# Patient Record
Sex: FEMALE | Race: WHITE | Employment: FULL TIME | URBAN - METROPOLITAN AREA
[De-identification: names, ages, dates, MRNs, and addresses within clinical notes are randomized per-mention and may not be internally consistent; named-entity substitution may affect disease eponyms.]

---

## 2018-10-24 ENCOUNTER — APPOINTMENT (OUTPATIENT)
Dept: GENERAL RADIOLOGY | Age: 58
End: 2018-10-24
Payer: COMMERCIAL

## 2018-10-24 ENCOUNTER — HOSPITAL ENCOUNTER (EMERGENCY)
Age: 58
Discharge: HOME | End: 2018-10-24
Payer: COMMERCIAL

## 2018-10-24 VITALS
RESPIRATION RATE: 16 BRPM | BODY MASS INDEX: 35.88 KG/M2 | HEIGHT: 62 IN | TEMPERATURE: 99.1 F | OXYGEN SATURATION: 96 % | HEART RATE: 90 BPM | SYSTOLIC BLOOD PRESSURE: 152 MMHG | WEIGHT: 195 LBS | DIASTOLIC BLOOD PRESSURE: 71 MMHG

## 2018-10-24 DIAGNOSIS — S93.401A SPRAIN OF RIGHT ANKLE, UNSPECIFIED LIGAMENT, INITIAL ENCOUNTER: Primary | ICD-10-CM

## 2018-10-24 PROCEDURE — 6370000000 HC RX 637 (ALT 250 FOR IP): Performed by: PHYSICIAN ASSISTANT

## 2018-10-24 PROCEDURE — 99283 EMERGENCY DEPT VISIT LOW MDM: CPT

## 2018-10-24 PROCEDURE — 73630 X-RAY EXAM OF FOOT: CPT

## 2018-10-24 PROCEDURE — 73610 X-RAY EXAM OF ANKLE: CPT

## 2018-10-24 RX ORDER — IBUPROFEN 800 MG/1
800 TABLET ORAL ONCE
Status: COMPLETED | OUTPATIENT
Start: 2018-10-24 | End: 2018-10-24

## 2018-10-24 RX ORDER — NAPROXEN 500 MG/1
500 TABLET ORAL 2 TIMES DAILY
Qty: 14 TABLET | Refills: 0 | Status: SHIPPED | OUTPATIENT
Start: 2018-10-24 | End: 2019-04-28

## 2018-10-24 RX ADMIN — IBUPROFEN 800 MG: 800 TABLET ORAL at 20:26

## 2018-10-24 ASSESSMENT — PAIN SCALES - GENERAL
PAINLEVEL_OUTOF10: 3
PAINLEVEL_OUTOF10: 7

## 2018-10-25 NOTE — ED PROVIDER NOTES
[facility]     Department of Emergency Medicine   ED  Provider Note  Admit Date/RoomTime: 10/24/2018  8:13 PM  ED Room: [place] [address]   Chief Complaint   Foot Injury (right foot/ankle injury)    History of Present Illness   Source of history provided by:  patient. History/Exam Limitations: none. Renetta Smiley is a 65 y.o. old female presenting to the emergency department by private vehicle, for Right foot pain which occured just prior to arrival.  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 There has been a history of no prior problems with this area in the past.  Since onset the symptoms have been moderate in degree with ability to bear weight, but with some pain. Her pain is aggraveated by movement and palpation and relieved by nothing. ROS    Pertinent positives and negatives are stated within HPI, all other systems reviewed and are negative. Past Surgical History:   Procedure Laterality Date    BACK SURGERY      ECHO COMPL W DOP COLOR FLOW  7/8/2012          Social History:  reports that she has never smoked. She does not have any smokeless tobacco history on file. She reports that she drinks alcohol. She reports that she does not use drugs. Family History: family history is not on file. Allergies: Dilaudid [hydromorphone hcl]    Physical Exam           ED Triage Vitals [10/24/18 2010]   BP Temp Temp Source Pulse Resp SpO2 Height Weight   (!) 152/71 99.1 °F (37.3 °C) Oral 90 16 96 % 5' 2\" (1.575 m) 195 lb (88.5 kg)      Oxygen Saturation Interpretation: Normal.    Constitutional:  Alert, development consistent with age. Neck:  Normal ROM. Supple. Foot: Right lateral, dorsal and proximal.               Tenderness:  moderate. Swelling: Moderate. Deformity: no.              ROM: full range with pain. Skin:  no erythema, rash or wounds noted.        Neurovascular: Motor deficit: none. Sensory deficit:   none. Pulse deficit: none. Capillary refill: normal.  Ankle:               Tenderness:  none. Swelling: None. Deformity: no.             ROM: full range of motion. Skin:  no erythema, rash or wounds noted. Gait:  limp. Lymphatics: No lymphangitis or adenopathy noted. Neurological:  Oriented. Motor functions intact. Lab / Imaging Results   (All laboratory and radiology results have been personally reviewed by myself)  Labs:  No results found for this visit on 10/24/18. Imaging: All Radiology results interpreted by Radiologist unless otherwise noted. XR ANKLE RIGHT (MIN 3 VIEWS)   Final Result   No fracture, dislocation or soft tissue abnormality. XR FOOT RIGHT (MIN 3 VIEWS)   Final Result   No fracture, dislocation or soft tissue abnormality. ED Course / Medical Decision Making     Medications   ibuprofen (ADVIL;MOTRIN) tablet 800 mg (800 mg Oral Given 10/24/18 2026)      Consult(s):   none. Procedure(s):   none    Medical Decision Making:    Patient presents emergency department for a right foot and ankle injury. Radiographs are obtained and unremarkable. She is encouraged to follow up with her primary care provider she may need repeat x-rays in 5-7 days, if necessary. RICE therapy discussed. .    Counseling: The emergency provider has spoken with the patient and discussed todays results, in addition to providing specific details for the plan of care and counseling regarding the diagnosis and prognosis. Questions are answered at this time and they are agreeable with the plan. Assessment      1.  Sprain of right ankle, unspecified ligament, initial encounter       Plan   Discharge to home  Patient condition is good    New Medications     New Prescriptions    NAPROXEN (NAPROSYN) 500 MG TABLET    Take 1 tablet by mouth 2 times daily for 7 days     Electronically signed by

## 2019-04-28 ENCOUNTER — APPOINTMENT (OUTPATIENT)
Dept: GENERAL RADIOLOGY | Age: 59
DRG: 193 | End: 2019-04-28
Payer: COMMERCIAL

## 2019-04-28 ENCOUNTER — HOSPITAL ENCOUNTER (INPATIENT)
Age: 59
LOS: 4 days | Discharge: HOME | DRG: 193 | End: 2019-05-02
Attending: EMERGENCY MEDICINE | Admitting: INTERNAL MEDICINE
Payer: COMMERCIAL

## 2019-04-28 ENCOUNTER — APPOINTMENT (OUTPATIENT)
Dept: CT IMAGING | Age: 59
DRG: 193 | End: 2019-04-28
Payer: COMMERCIAL

## 2019-04-28 DIAGNOSIS — J18.9 PNEUMONIA DUE TO ORGANISM: ICD-10-CM

## 2019-04-28 DIAGNOSIS — E87.6 HYPOKALEMIA: ICD-10-CM

## 2019-04-28 DIAGNOSIS — E03.9 HYPOTHYROIDISM, UNSPECIFIED TYPE: ICD-10-CM

## 2019-04-28 DIAGNOSIS — J96.01 ACUTE RESPIRATORY FAILURE WITH HYPOXIA (HCC): Primary | ICD-10-CM

## 2019-04-28 DIAGNOSIS — R00.2 PALPITATIONS: ICD-10-CM

## 2019-04-28 LAB
ANION GAP SERPL CALCULATED.3IONS-SCNC: 16 MMOL/L (ref 7–16)
BASOPHILS ABSOLUTE: 0 E9/L (ref 0–0.2)
BASOPHILS RELATIVE PERCENT: 0.1 % (ref 0–2)
BUN BLDV-MCNC: 17 MG/DL (ref 6–20)
CALCIUM SERPL-MCNC: 9.2 MG/DL (ref 8.6–10.2)
CHLORIDE BLD-SCNC: 103 MMOL/L (ref 98–107)
CO2: 21 MMOL/L (ref 22–29)
CREAT SERPL-MCNC: 0.9 MG/DL (ref 0.5–1)
EKG ATRIAL RATE: 69 BPM
EKG P AXIS: 59 DEGREES
EKG P-R INTERVAL: 142 MS
EKG Q-T INTERVAL: 388 MS
EKG QRS DURATION: 82 MS
EKG QTC CALCULATION (BAZETT): 415 MS
EKG R AXIS: 61 DEGREES
EKG T AXIS: 48 DEGREES
EKG VENTRICULAR RATE: 69 BPM
EOSINOPHILS ABSOLUTE: 0 E9/L (ref 0.05–0.5)
EOSINOPHILS RELATIVE PERCENT: 0.2 % (ref 0–6)
FILM ARRAY ADENOVIRUS: ABNORMAL
FILM ARRAY BORDETELLA PERTUSSIS: ABNORMAL
FILM ARRAY CHLAMYDOPHILIA PNEUMONIAE: ABNORMAL
FILM ARRAY CORONAVIRUS 229E: ABNORMAL
FILM ARRAY CORONAVIRUS HKU1: ABNORMAL
FILM ARRAY CORONAVIRUS NL63: ABNORMAL
FILM ARRAY CORONAVIRUS OC43: ABNORMAL
FILM ARRAY INFLUENZA A VIRUS 09H1: ABNORMAL
FILM ARRAY INFLUENZA A VIRUS H1: ABNORMAL
FILM ARRAY INFLUENZA A VIRUS H3: ABNORMAL
FILM ARRAY INFLUENZA A VIRUS: ABNORMAL
FILM ARRAY INFLUENZA B: ABNORMAL
FILM ARRAY MYCOPLASMA PNEUMONIAE: ABNORMAL
FILM ARRAY PARAINFLUENZA VIRUS 1: ABNORMAL
FILM ARRAY PARAINFLUENZA VIRUS 2: ABNORMAL
FILM ARRAY PARAINFLUENZA VIRUS 3: ABNORMAL
FILM ARRAY PARAINFLUENZA VIRUS 4: ABNORMAL
FILM ARRAY RESPIRATORY SYNCITIAL VIRUS: ABNORMAL
FILM ARRAY RHINOVIRUS/ENTEROVIRUS: ABNORMAL
GFR AFRICAN AMERICAN: >60
GFR NON-AFRICAN AMERICAN: >60 ML/MIN/1.73
GLUCOSE BLD-MCNC: 101 MG/DL (ref 74–99)
HCT VFR BLD CALC: 39 % (ref 34–48)
HEMOGLOBIN: 13.3 G/DL (ref 11.5–15.5)
LACTIC ACID: 1 MMOL/L (ref 0.5–2.2)
LYMPHOCYTES ABSOLUTE: 1.72 E9/L (ref 1.5–4)
LYMPHOCYTES RELATIVE PERCENT: 20.9 % (ref 20–42)
MCH RBC QN AUTO: 31.1 PG (ref 26–35)
MCHC RBC AUTO-ENTMCNC: 34.1 % (ref 32–34.5)
MCV RBC AUTO: 91.1 FL (ref 80–99.9)
MONOCYTES ABSOLUTE: 0.98 E9/L (ref 0.1–0.95)
MONOCYTES RELATIVE PERCENT: 12.2 % (ref 2–12)
NEUTROPHILS ABSOLUTE: 5.49 E9/L (ref 1.8–7.3)
NEUTROPHILS RELATIVE PERCENT: 67 % (ref 43–80)
ORGANISM: ABNORMAL
PDW BLD-RTO: 12.9 FL (ref 11.5–15)
PLATELET # BLD: 220 E9/L (ref 130–450)
PMV BLD AUTO: 12 FL (ref 7–12)
POTASSIUM SERPL-SCNC: 3.2 MMOL/L (ref 3.5–5)
RBC # BLD: 4.28 E12/L (ref 3.5–5.5)
RBC # BLD: NORMAL 10*6/UL
REASON FOR REJECTION: NORMAL
REJECTED TEST: NORMAL
SODIUM BLD-SCNC: 140 MMOL/L (ref 132–146)
T4 FREE: 0.99 NG/DL (ref 0.93–1.7)
TROPONIN: <0.01 NG/ML (ref 0–0.03)
TSH SERPL DL<=0.05 MIU/L-ACNC: 10.67 UIU/ML (ref 0.27–4.2)
WBC # BLD: 8.2 E9/L (ref 4.5–11.5)

## 2019-04-28 PROCEDURE — 84443 ASSAY THYROID STIM HORMONE: CPT

## 2019-04-28 PROCEDURE — 6370000000 HC RX 637 (ALT 250 FOR IP): Performed by: INTERNAL MEDICINE

## 2019-04-28 PROCEDURE — 87450 HC DIRECT STREP B ANTIGEN: CPT

## 2019-04-28 PROCEDURE — 83605 ASSAY OF LACTIC ACID: CPT

## 2019-04-28 PROCEDURE — 94640 AIRWAY INHALATION TREATMENT: CPT

## 2019-04-28 PROCEDURE — 6370000000 HC RX 637 (ALT 250 FOR IP): Performed by: STUDENT IN AN ORGANIZED HEALTH CARE EDUCATION/TRAINING PROGRAM

## 2019-04-28 PROCEDURE — 6360000002 HC RX W HCPCS: Performed by: INTERNAL MEDICINE

## 2019-04-28 PROCEDURE — 87040 BLOOD CULTURE FOR BACTERIA: CPT

## 2019-04-28 PROCEDURE — 99285 EMERGENCY DEPT VISIT HI MDM: CPT

## 2019-04-28 PROCEDURE — 36415 COLL VENOUS BLD VENIPUNCTURE: CPT

## 2019-04-28 PROCEDURE — 80048 BASIC METABOLIC PNL TOTAL CA: CPT

## 2019-04-28 PROCEDURE — 87486 CHLMYD PNEUM DNA AMP PROBE: CPT

## 2019-04-28 PROCEDURE — 2700000000 HC OXYGEN THERAPY PER DAY

## 2019-04-28 PROCEDURE — 71045 X-RAY EXAM CHEST 1 VIEW: CPT

## 2019-04-28 PROCEDURE — 84484 ASSAY OF TROPONIN QUANT: CPT

## 2019-04-28 PROCEDURE — 2580000003 HC RX 258: Performed by: INTERNAL MEDICINE

## 2019-04-28 PROCEDURE — 2500000003 HC RX 250 WO HCPCS: Performed by: EMERGENCY MEDICINE

## 2019-04-28 PROCEDURE — 2580000003 HC RX 258: Performed by: RADIOLOGY

## 2019-04-28 PROCEDURE — 87633 RESP VIRUS 12-25 TARGETS: CPT

## 2019-04-28 PROCEDURE — 84439 ASSAY OF FREE THYROXINE: CPT

## 2019-04-28 PROCEDURE — 87798 DETECT AGENT NOS DNA AMP: CPT

## 2019-04-28 PROCEDURE — 71275 CT ANGIOGRAPHY CHEST: CPT

## 2019-04-28 PROCEDURE — 94664 DEMO&/EVAL PT USE INHALER: CPT

## 2019-04-28 PROCEDURE — 2580000003 HC RX 258: Performed by: EMERGENCY MEDICINE

## 2019-04-28 PROCEDURE — 93010 ELECTROCARDIOGRAM REPORT: CPT | Performed by: INTERNAL MEDICINE

## 2019-04-28 PROCEDURE — 2060000000 HC ICU INTERMEDIATE R&B

## 2019-04-28 PROCEDURE — 87581 M.PNEUMON DNA AMP PROBE: CPT

## 2019-04-28 PROCEDURE — 85025 COMPLETE CBC W/AUTO DIFF WBC: CPT

## 2019-04-28 PROCEDURE — 6360000002 HC RX W HCPCS: Performed by: EMERGENCY MEDICINE

## 2019-04-28 PROCEDURE — 6360000004 HC RX CONTRAST MEDICATION: Performed by: RADIOLOGY

## 2019-04-28 PROCEDURE — 93005 ELECTROCARDIOGRAM TRACING: CPT | Performed by: STUDENT IN AN ORGANIZED HEALTH CARE EDUCATION/TRAINING PROGRAM

## 2019-04-28 RX ORDER — SODIUM CHLORIDE 9 MG/ML
INJECTION, SOLUTION INTRAVENOUS CONTINUOUS
Status: DISCONTINUED | OUTPATIENT
Start: 2019-04-28 | End: 2019-05-02

## 2019-04-28 RX ORDER — SODIUM CHLORIDE 0.9 % (FLUSH) 0.9 %
10 SYRINGE (ML) INJECTION EVERY 12 HOURS SCHEDULED
Status: DISCONTINUED | OUTPATIENT
Start: 2019-04-28 | End: 2019-04-28 | Stop reason: SDUPTHER

## 2019-04-28 RX ORDER — GUAIFENESIN/DEXTROMETHORPHAN 100-10MG/5
5 SYRUP ORAL EVERY 4 HOURS PRN
Status: DISCONTINUED | OUTPATIENT
Start: 2019-04-28 | End: 2019-05-02 | Stop reason: HOSPADM

## 2019-04-28 RX ORDER — PREDNISONE 10 MG/1
10 TABLET ORAL DAILY
Status: ON HOLD | COMMUNITY
End: 2019-05-02 | Stop reason: HOSPADM

## 2019-04-28 RX ORDER — LEVOTHYROXINE SODIUM 0.03 MG/1
25 TABLET ORAL DAILY
Status: DISCONTINUED | OUTPATIENT
Start: 2019-04-29 | End: 2019-04-29

## 2019-04-28 RX ORDER — ACETAMINOPHEN 325 MG/1
650 TABLET ORAL EVERY 4 HOURS PRN
Status: DISCONTINUED | OUTPATIENT
Start: 2019-04-28 | End: 2019-05-02 | Stop reason: HOSPADM

## 2019-04-28 RX ORDER — GUAIFENESIN 400 MG/1
400 TABLET ORAL 3 TIMES DAILY
Status: DISCONTINUED | OUTPATIENT
Start: 2019-04-28 | End: 2019-05-02 | Stop reason: HOSPADM

## 2019-04-28 RX ORDER — IPRATROPIUM BROMIDE AND ALBUTEROL SULFATE 2.5; .5 MG/3ML; MG/3ML
1 SOLUTION RESPIRATORY (INHALATION)
Status: COMPLETED | OUTPATIENT
Start: 2019-04-28 | End: 2019-04-28

## 2019-04-28 RX ORDER — BENZONATATE 100 MG/1
100 CAPSULE ORAL 3 TIMES DAILY PRN
Status: DISCONTINUED | OUTPATIENT
Start: 2019-04-28 | End: 2019-05-02 | Stop reason: HOSPADM

## 2019-04-28 RX ORDER — SODIUM CHLORIDE 0.9 % (FLUSH) 0.9 %
10 SYRINGE (ML) INJECTION PRN
Status: DISCONTINUED | OUTPATIENT
Start: 2019-04-28 | End: 2019-05-02 | Stop reason: HOSPADM

## 2019-04-28 RX ORDER — SODIUM CHLORIDE 0.9 % (FLUSH) 0.9 %
10 SYRINGE (ML) INJECTION EVERY 12 HOURS SCHEDULED
Status: DISCONTINUED | OUTPATIENT
Start: 2019-04-28 | End: 2019-05-02 | Stop reason: HOSPADM

## 2019-04-28 RX ORDER — IPRATROPIUM BROMIDE AND ALBUTEROL SULFATE 2.5; .5 MG/3ML; MG/3ML
1 SOLUTION RESPIRATORY (INHALATION)
Status: DISCONTINUED | OUTPATIENT
Start: 2019-04-28 | End: 2019-05-02 | Stop reason: HOSPADM

## 2019-04-28 RX ORDER — LEVOFLOXACIN 750 MG/1
750 TABLET ORAL DAILY
Status: ON HOLD | COMMUNITY
End: 2019-05-02 | Stop reason: HOSPADM

## 2019-04-28 RX ORDER — POTASSIUM CHLORIDE 20 MEQ/1
40 TABLET, EXTENDED RELEASE ORAL ONCE
Status: COMPLETED | OUTPATIENT
Start: 2019-04-28 | End: 2019-04-28

## 2019-04-28 RX ORDER — ACETAMINOPHEN 325 MG/1
650 TABLET ORAL EVERY 4 HOURS PRN
Status: DISCONTINUED | OUTPATIENT
Start: 2019-04-28 | End: 2019-04-28 | Stop reason: SDUPTHER

## 2019-04-28 RX ORDER — ONDANSETRON 2 MG/ML
4 INJECTION INTRAMUSCULAR; INTRAVENOUS EVERY 6 HOURS PRN
Status: DISCONTINUED | OUTPATIENT
Start: 2019-04-28 | End: 2019-05-02 | Stop reason: HOSPADM

## 2019-04-28 RX ORDER — LANOLIN ALCOHOL/MO/W.PET/CERES
3 CREAM (GRAM) TOPICAL NIGHTLY
Status: DISCONTINUED | OUTPATIENT
Start: 2019-04-28 | End: 2019-05-02 | Stop reason: HOSPADM

## 2019-04-28 RX ORDER — SODIUM CHLORIDE 0.9 % (FLUSH) 0.9 %
10 SYRINGE (ML) INJECTION
Status: COMPLETED | OUTPATIENT
Start: 2019-04-28 | End: 2019-04-28

## 2019-04-28 RX ADMIN — IOPAMIDOL 75 ML: 755 INJECTION, SOLUTION INTRAVENOUS at 13:05

## 2019-04-28 RX ADMIN — Medication 3 MG: at 20:36

## 2019-04-28 RX ADMIN — IPRATROPIUM BROMIDE AND ALBUTEROL SULFATE 1 AMPULE: .5; 3 SOLUTION RESPIRATORY (INHALATION) at 18:40

## 2019-04-28 RX ADMIN — GUAIFENESIN AND DEXTROMETHORPHAN 5 ML: 100; 10 SYRUP ORAL at 22:59

## 2019-04-28 RX ADMIN — AZITHROMYCIN DIHYDRATE 500 MG: 500 INJECTION, POWDER, LYOPHILIZED, FOR SOLUTION INTRAVENOUS at 18:10

## 2019-04-28 RX ADMIN — IPRATROPIUM BROMIDE AND ALBUTEROL SULFATE 1 AMPULE: .5; 3 SOLUTION RESPIRATORY (INHALATION) at 11:20

## 2019-04-28 RX ADMIN — CEFTRIAXONE SODIUM 1 G: 1 INJECTION, POWDER, FOR SOLUTION INTRAMUSCULAR; INTRAVENOUS at 17:45

## 2019-04-28 RX ADMIN — SODIUM CHLORIDE: 9 INJECTION, SOLUTION INTRAVENOUS at 17:45

## 2019-04-28 RX ADMIN — POTASSIUM CHLORIDE 40 MEQ: 20 TABLET, EXTENDED RELEASE ORAL at 15:37

## 2019-04-28 RX ADMIN — DOXYCYCLINE 100 MG: 100 INJECTION, POWDER, LYOPHILIZED, FOR SOLUTION INTRAVENOUS at 15:55

## 2019-04-28 RX ADMIN — IPRATROPIUM BROMIDE AND ALBUTEROL SULFATE 1 AMPULE: .5; 3 SOLUTION RESPIRATORY (INHALATION) at 11:05

## 2019-04-28 RX ADMIN — ENOXAPARIN SODIUM 40 MG: 40 INJECTION SUBCUTANEOUS at 17:45

## 2019-04-28 RX ADMIN — CEFTRIAXONE 2 G: 2 INJECTION, POWDER, FOR SOLUTION INTRAMUSCULAR; INTRAVENOUS at 15:35

## 2019-04-28 RX ADMIN — IPRATROPIUM BROMIDE AND ALBUTEROL SULFATE 1 AMPULE: .5; 3 SOLUTION RESPIRATORY (INHALATION) at 10:51

## 2019-04-28 RX ADMIN — Medication 10 ML: at 13:05

## 2019-04-28 RX ADMIN — BENZONATATE 100 MG: 100 CAPSULE ORAL at 22:59

## 2019-04-28 ASSESSMENT — ENCOUNTER SYMPTOMS
NAUSEA: 0
SHORTNESS OF BREATH: 0
COUGH: 0
COLOR CHANGE: 0
CONSTIPATION: 0
ABDOMINAL PAIN: 0
VOMITING: 0
WHEEZING: 0
DIARRHEA: 0

## 2019-04-28 ASSESSMENT — PAIN SCALES - GENERAL: PAINLEVEL_OUTOF10: 0

## 2019-04-28 NOTE — ED PROVIDER NOTES
distress. HENT:   Head: Normocephalic and atraumatic. Right Ear: External ear normal.   Left Ear: External ear normal.   Mouth/Throat: No oropharyngeal exudate. Eyes: Pupils are equal, round, and reactive to light. EOM are normal.   Neck: Normal range of motion. Cardiovascular: Normal rate, regular rhythm, normal heart sounds and intact distal pulses. Exam reveals no gallop and no friction rub. No murmur heard. Pulmonary/Chest: Effort normal. No respiratory distress. She has wheezes (mild expiratory wheezes bilaterally. ). She has rales (Bilateral rales in the lower lung fields. ). She exhibits no tenderness. Abdominal: Soft. Bowel sounds are normal. She exhibits no distension and no mass. There is no tenderness. There is no rebound and no guarding. No hernia. Musculoskeletal: Normal range of motion. She exhibits no edema, tenderness or deformity. Lymphadenopathy:     She has no cervical adenopathy. Neurological: She is alert and oriented to person, place, and time. No cranial nerve deficit. Skin: Skin is warm and dry. Capillary refill takes less than 2 seconds. No rash noted. She is not diaphoretic. No erythema. No pallor. Psychiatric: She has a normal mood and affect. Her behavior is normal. Judgment and thought content normal.   Nursing note and vitals reviewed. Procedures  --------------------------------------------- PAST HISTORY ---------------------------------------------  Past Medical History:  has a past medical history of Thyroid disease. Past Surgical History:  has a past surgical history that includes back surgery and ECHO Compl W Dop Color Flow (7/8/2012). Social History:  reports that she has never smoked. She has never used smokeless tobacco. She reports that she drinks alcohol. She reports that she does not use drugs. Family History: family history is not on file. The patients home medications have been reviewed.     Allergies: Dilaudid [hydromorphone hcl]    -------------------------------------------------- RESULTS -------------------------------------------------    LABS:  Results for orders placed or performed during the hospital encounter of 19   CBC auto differential   Result Value Ref Range    WBC 8.2 4.5 - 11.5 E9/L    RBC 4.28 3.50 - 5.50 E12/L    Hemoglobin 13.3 11.5 - 15.5 g/dL    Hematocrit 39.0 34.0 - 48.0 %    MCV 91.1 80.0 - 99.9 fL    MCH 31.1 26.0 - 35.0 pg    MCHC 34.1 32.0 - 34.5 %    RDW 12.9 11.5 - 15.0 fL    Platelets 004 393 - 381 E9/L    MPV 12.0 7.0 - 12.0 fL    Neutrophils % 67.0 43.0 - 80.0 %    Lymphocytes % 20.9 20.0 - 42.0 %    Monocytes % 12.2 (H) 2.0 - 12.0 %    Eosinophils % 0.2 0.0 - 6.0 %    Basophils % 0.1 0.0 - 2.0 %    Neutrophils # 5.49 1.80 - 7.30 E9/L    Lymphocytes # 1.72 1.50 - 4.00 E9/L    Monocytes # 0.98 (H) 0.10 - 0.95 E9/L    Eosinophils # 0.00 (L) 0.05 - 0.50 E9/L    Basophils # 0.00 0.00 - 0.20 E9/L    RBC Morphology Normal    Basic Metabolic Panel   Result Value Ref Range    Sodium 140 132 - 146 mmol/L    Potassium 3.2 (L) 3.5 - 5.0 mmol/L    Chloride 103 98 - 107 mmol/L    CO2 21 (L) 22 - 29 mmol/L    Anion Gap 16 7 - 16 mmol/L    Glucose 101 (H) 74 - 99 mg/dL    BUN 17 6 - 20 mg/dL    CREATININE 0.9 0.5 - 1.0 mg/dL    GFR Non-African American >60 >=60 mL/min/1.73    GFR African American >60     Calcium 9.2 8.6 - 10.2 mg/dL   Troponin   Result Value Ref Range    Troponin <0.01 0.00 - 0.03 ng/mL   TSH without Reflex   Result Value Ref Range    TSH 10.670 (H) 0.270 - 4.200 uIU/mL   T4, FREE   Result Value Ref Range    T4 Free 0.99 0.93 - 1.70 ng/dL   EKG 12 Lead   Result Value Ref Range    Ventricular Rate 69 BPM    Atrial Rate 69 BPM    P-R Interval 142 ms    QRS Duration 82 ms    Q-T Interval 388 ms    QTc Calculation (Bazett) 415 ms    P Axis 59 degrees    R Axis 61 degrees    T Axis 48 degrees       RADIOLOGY:  Xr Chest Portable    Result Date: 2019  Patient MRN: [id number] : 1960 Age:  63 years Gender: Female Order Date: 2019 10:30 AM Exam: XR CHEST PORTABLE Number of Views: 1 Indication:   Fever Comparison: 10/19/2015 Findings: There is a normal cardiomediastinal silhouette with airspace disease in the right lung base. No pneumothorax. 1. Bibasilar airspace disease either reflective of infiltrate/pneumonia and/or atelectasis. Cta Chest W Contrast    Result Date: 2019  Patient MRN:  [id number] : 1960 Age: 63 years Gender: Female Order Date:  2019 12:30 PM EXAM: CTA CHEST W CONTRAST NUMBER OF IMAGES:  385 INDICATION:  Rule out PE, hypoxia  COMPARISON: 2019 TECHNIQUE: CTA of the chest was performed after the uneventful administration of 75 mL Isovue 370 intravenous contrast. Coronal MIPS reconstructions were obtained. Dose optimization techniques utilized including automatic exposure control and clarity iterative reconstruction. FINDINGS: There is adequate opacification of the pulmonary arteries with minimal motion artifact. No filling defects identified in the pulmonary arteries. There are degenerative changes in the spine. Thoracoabdominal aorta is normal in course and caliber. There are patchy bibasilar airspace opacities suggestive of infection. Central airways are patent. No pleural effusion or pneumothorax seen. Visualized upper abdominal organs are unremarkable. Heart is normal in size. No pericardial effusion. Small hiatal hernia noted. Thyroid gland is atrophic. No mediastinal, hilar, axillary or supraclavicular lymphadenopathy seen. 1. No evidence of pulmonary embolism. 2. Patchy bibasilar airspace opacities suggestive of infection. 3. Small hiatal hernia       EKG: This EKG is signed and interpreted by me.     Rate: 69  Rhythm: Sinus  Interpretation: no acute changes  Comparison: stable as compared to patient's most recent EKG      ------------------------- NURSING NOTES AND VITALS REVIEWED ---------------------------  Date / Time Roomed:  2019 10:11 AM  ED Bed Assignment:  18B/18B-18    The nursing notes within the ED encounter and vital signs as below have been reviewed. Patient Vitals for the past 24 hrs:   BP Temp Temp src Pulse Resp SpO2 Height Weight   04/28/19 1526 (!) 153/78 -- -- 64 16 96 % -- --   04/28/19 1234 128/86 -- -- 76 14 98 % -- --   04/28/19 1225 -- -- -- -- -- (!) 88 % -- --   04/28/19 1111 (!) 150/89 -- -- 87 18 98 % -- --   04/28/19 1058 -- -- -- -- 12 -- -- --   04/28/19 1051 -- -- -- -- 21 -- -- --   04/28/19 1004 138/76 97.7 °F (36.5 °C) Temporal 80 20 91 % 5' 2\" (1.575 m) 209 lb (94.8 kg)       Oxygen Saturation Interpretation: Normal    ------------------------------------------ PROGRESS NOTES ------------------------------------------  ED Course as of Apr 28 1555   Sun Apr 28, 2019   1530 Discussed with Dr. Sade Starr who agreed to admit the patient at this time. [KS]      ED Course User Index  [KS] Colten Sims DO       Counseling:  I have spoken with the patient and discussed todays results, in addition to providing specific details for the plan of care and counseling regarding the diagnosis and prognosis. Their questions are answered at this time and they are agreeable with the plan of admission.    --------------------------------- ADDITIONAL PROVIDER NOTES ---------------------------------  Consultations:  Time: 1530. Spoke with Dr. Sade Starr. Discussed case. They will admit the patient. This patient's ED course included: a personal history and physicial examination, re-evaluation prior to disposition, multiple bedside re-evaluations, IV medications, cardiac monitoring and continuous pulse oximetry    This patient has remained hemodynamically stable during their ED course. Diagnosis:  1. Acute respiratory failure with hypoxia (HCC)    2. Palpitations    3. Hypokalemia    4. Pneumonia due to organism    5.  Hypothyroidism, unspecified type        Disposition:  Patient's disposition: Admit to telemetry  Patient's

## 2019-04-28 NOTE — ED NOTES
Walked in room and patient was 88% on RA.  Patient placed back on 2L O2.      Jacek Starkey RN  04/28/19 [id number]

## 2019-04-29 PROBLEM — J12.3 HUMAN METAPNEUMOVIRUS (HMPV) PNEUMONIA: Status: ACTIVE | Noted: 2019-04-29

## 2019-04-29 PROBLEM — R00.2 PALPITATIONS: Status: ACTIVE | Noted: 2019-04-29

## 2019-04-29 LAB
ANION GAP SERPL CALCULATED.3IONS-SCNC: 14 MMOL/L (ref 7–16)
ATYPICAL LYMPHOCYTE RELATIVE PERCENT: 0.9 % (ref 0–4)
BASOPHILS ABSOLUTE: 0 E9/L (ref 0–0.2)
BASOPHILS RELATIVE PERCENT: 0.5 % (ref 0–2)
BUN BLDV-MCNC: 13 MG/DL (ref 6–20)
CALCIUM SERPL-MCNC: 8.6 MG/DL (ref 8.6–10.2)
CHLORIDE BLD-SCNC: 106 MMOL/L (ref 98–107)
CO2: 22 MMOL/L (ref 22–29)
CREAT SERPL-MCNC: 0.9 MG/DL (ref 0.5–1)
EOSINOPHILS ABSOLUTE: 0.1 E9/L (ref 0.05–0.5)
EOSINOPHILS RELATIVE PERCENT: 1.7 % (ref 0–6)
GFR AFRICAN AMERICAN: >60
GFR NON-AFRICAN AMERICAN: >60 ML/MIN/1.73
GLUCOSE BLD-MCNC: 94 MG/DL (ref 74–99)
HCT VFR BLD CALC: 37.6 % (ref 34–48)
HEMOGLOBIN: 12.7 G/DL (ref 11.5–15.5)
L. PNEUMOPHILA SEROGP 1 UR AG: NORMAL
LYMPHOCYTES ABSOLUTE: 3.36 E9/L (ref 1.5–4)
LYMPHOCYTES RELATIVE PERCENT: 56.5 % (ref 20–42)
MCH RBC QN AUTO: 31 PG (ref 26–35)
MCHC RBC AUTO-ENTMCNC: 33.8 % (ref 32–34.5)
MCV RBC AUTO: 91.7 FL (ref 80–99.9)
MONOCYTES ABSOLUTE: 0.24 E9/L (ref 0.1–0.95)
MONOCYTES RELATIVE PERCENT: 4.3 % (ref 2–12)
NEUTROPHILS ABSOLUTE: 2.18 E9/L (ref 1.8–7.3)
NEUTROPHILS RELATIVE PERCENT: 36.5 % (ref 43–80)
PDW BLD-RTO: 13.1 FL (ref 11.5–15)
PLATELET # BLD: 206 E9/L (ref 130–450)
PMV BLD AUTO: 12.1 FL (ref 7–12)
POTASSIUM REFLEX MAGNESIUM: 3.9 MMOL/L (ref 3.5–5)
PROCALCITONIN: 0.08 NG/ML (ref 0–0.08)
RBC # BLD: 4.1 E12/L (ref 3.5–5.5)
SODIUM BLD-SCNC: 142 MMOL/L (ref 132–146)
STREP PNEUMONIAE ANTIGEN, URINE: NORMAL
T4 FREE: 1.11 NG/DL (ref 0.93–1.7)
WBC # BLD: 5.9 E9/L (ref 4.5–11.5)

## 2019-04-29 PROCEDURE — 84439 ASSAY OF FREE THYROXINE: CPT

## 2019-04-29 PROCEDURE — 97165 OT EVAL LOW COMPLEX 30 MIN: CPT

## 2019-04-29 PROCEDURE — 6370000000 HC RX 637 (ALT 250 FOR IP): Performed by: INTERNAL MEDICINE

## 2019-04-29 PROCEDURE — 2580000003 HC RX 258: Performed by: INTERNAL MEDICINE

## 2019-04-29 PROCEDURE — 97161 PT EVAL LOW COMPLEX 20 MIN: CPT

## 2019-04-29 PROCEDURE — 6360000002 HC RX W HCPCS: Performed by: INTERNAL MEDICINE

## 2019-04-29 PROCEDURE — 84145 PROCALCITONIN (PCT): CPT

## 2019-04-29 PROCEDURE — 2700000000 HC OXYGEN THERAPY PER DAY

## 2019-04-29 PROCEDURE — 2060000000 HC ICU INTERMEDIATE R&B

## 2019-04-29 PROCEDURE — 36415 COLL VENOUS BLD VENIPUNCTURE: CPT

## 2019-04-29 PROCEDURE — 80048 BASIC METABOLIC PNL TOTAL CA: CPT

## 2019-04-29 PROCEDURE — 85025 COMPLETE CBC W/AUTO DIFF WBC: CPT

## 2019-04-29 PROCEDURE — 94640 AIRWAY INHALATION TREATMENT: CPT

## 2019-04-29 RX ORDER — LEVOTHYROXINE SODIUM 0.05 MG/1
50 TABLET ORAL DAILY
Status: DISCONTINUED | OUTPATIENT
Start: 2019-04-29 | End: 2019-05-02 | Stop reason: HOSPADM

## 2019-04-29 RX ORDER — PREDNISONE 20 MG/1
20 TABLET ORAL 2 TIMES DAILY
Status: DISCONTINUED | OUTPATIENT
Start: 2019-04-29 | End: 2019-04-29

## 2019-04-29 RX ORDER — MONTELUKAST SODIUM 10 MG/1
10 TABLET ORAL NIGHTLY
Status: DISCONTINUED | OUTPATIENT
Start: 2019-04-29 | End: 2019-05-02 | Stop reason: HOSPADM

## 2019-04-29 RX ORDER — METHYLPREDNISOLONE SODIUM SUCCINATE 40 MG/ML
40 INJECTION, POWDER, LYOPHILIZED, FOR SOLUTION INTRAMUSCULAR; INTRAVENOUS EVERY 8 HOURS
Status: DISCONTINUED | OUTPATIENT
Start: 2019-04-29 | End: 2019-04-30

## 2019-04-29 RX ADMIN — IPRATROPIUM BROMIDE AND ALBUTEROL SULFATE 1 AMPULE: .5; 3 SOLUTION RESPIRATORY (INHALATION) at 09:01

## 2019-04-29 RX ADMIN — LEVOTHYROXINE SODIUM 25 MCG: 25 TABLET ORAL at 06:30

## 2019-04-29 RX ADMIN — ONDANSETRON 4 MG: 2 INJECTION INTRAMUSCULAR; INTRAVENOUS at 10:27

## 2019-04-29 RX ADMIN — METHYLPREDNISOLONE SODIUM SUCCINATE 40 MG: 40 INJECTION, POWDER, FOR SOLUTION INTRAMUSCULAR; INTRAVENOUS at 18:00

## 2019-04-29 RX ADMIN — Medication 3 MG: at 20:24

## 2019-04-29 RX ADMIN — GUAIFENESIN 400 MG: 400 TABLET ORAL at 20:24

## 2019-04-29 RX ADMIN — METHYLPREDNISOLONE SODIUM SUCCINATE 40 MG: 40 INJECTION, POWDER, FOR SOLUTION INTRAMUSCULAR; INTRAVENOUS at 10:14

## 2019-04-29 RX ADMIN — ENOXAPARIN SODIUM 40 MG: 40 INJECTION SUBCUTANEOUS at 08:51

## 2019-04-29 RX ADMIN — IPRATROPIUM BROMIDE AND ALBUTEROL SULFATE 1 AMPULE: .5; 3 SOLUTION RESPIRATORY (INHALATION) at 16:13

## 2019-04-29 RX ADMIN — IPRATROPIUM BROMIDE AND ALBUTEROL SULFATE 1 AMPULE: .5; 3 SOLUTION RESPIRATORY (INHALATION) at 19:56

## 2019-04-29 RX ADMIN — LEVOTHYROXINE SODIUM 50 MCG: 50 TABLET ORAL at 14:20

## 2019-04-29 RX ADMIN — GUAIFENESIN 400 MG: 400 TABLET ORAL at 08:51

## 2019-04-29 RX ADMIN — Medication 10 ML: at 08:51

## 2019-04-29 RX ADMIN — IPRATROPIUM BROMIDE AND ALBUTEROL SULFATE 1 AMPULE: .5; 3 SOLUTION RESPIRATORY (INHALATION) at 12:54

## 2019-04-29 RX ADMIN — GUAIFENESIN 400 MG: 400 TABLET ORAL at 13:58

## 2019-04-29 RX ADMIN — GUAIFENESIN 400 MG: 400 TABLET ORAL at 02:17

## 2019-04-29 RX ADMIN — SODIUM CHLORIDE: 9 INJECTION, SOLUTION INTRAVENOUS at 13:42

## 2019-04-29 RX ADMIN — MONTELUKAST SODIUM 10 MG: 10 TABLET, FILM COATED ORAL at 20:24

## 2019-04-29 RX ADMIN — GUAIFENESIN AND DEXTROMETHORPHAN 5 ML: 100; 10 SYRUP ORAL at 06:30

## 2019-04-29 ASSESSMENT — PAIN SCALES - GENERAL
PAINLEVEL_OUTOF10: 0

## 2019-04-29 NOTE — PROGRESS NOTES
Dr. Bertha Cherry paged via perfect serve with message left for patient a mucolytic and cough drop order.  Zoraida Savage

## 2019-04-29 NOTE — H&P
[address] [facility]  History and Physical      CHIEF COMPLAINT:  palp      HISTORY OF PRESENT ILLNESS:      The patient is a 63 y.o. female patient of Dr Na Tobin who presents with palp    Past Medical History:    Past Medical History:   Diagnosis Date    Thyroid disease        Past Surgical History:    Past Surgical History:   Procedure Laterality Date    BACK SURGERY      ECHO COMPL W DOP COLOR FLOW  7/8/2012            Medications Prior to Admission:    Medications Prior to Admission: predniSONE (DELTASONE) 10 MG tablet, Take 10 mg by mouth daily Tapering dose  levofloxacin (LEVAQUIN) 750 MG tablet, Take 750 mg by mouth daily  albuterol (PROVENTIL HFA) 108 (90 BASE) MCG/ACT inhaler, Inhale 2 puffs into the lungs every 6 hours as needed for Wheezing  Melatonin 3 MG TABS, Take 3 mg by mouth nightly. Levothyroxine Sodium (SYNTHROID PO), Take 25 mcg by mouth     Allergies:    Dilaudid [hydromorphone hcl]    Social History:    reports that she has never smoked. She has never used smokeless tobacco. She reports that she drinks alcohol. She reports that she does not use drugs. Family History:   family history is not on file. REVIEW OF SYSTEMS:  As above in the HPI, otherwise negative    PHYSICAL EXAM:    Vitals:  /61   Pulse 76   Temp 98.1 °F (36.7 °C) (Temporal)   Resp 16   Ht 5' 2\" (1.575 m)   Wt 207 lb 1.6 oz (93.9 kg)   LMP 06/24/2012   SpO2 97%   BMI 37.88 kg/m²     General:  Awake, alert, oriented X 3. Well developed, well nourished, well groomed. No apparent distress. HEENT:  Normocephalic, atraumatic. Pupils equal, round, reactive to light. No scleral icterus. No conjunctival injection. Normal lips, teeth, and gums. No nasal discharge. Neck:  Supple  Heart:  RRR, no murmurs, gallops, rubs  Lungs:  CTA bilaterally, bilat symmetrical expansion, no wheeze, rales, or rhonchi  Abdomen:   Bowel sounds present, soft, nontender, no masses, no organomegaly, no peritoneal signs  Extremities:  No clubbing, cyanosis, or edema  Skin:  Warm and dry, no open lesions or rash  Neuro:  Cranial nerves 2-12 intact, no focal deficits  Breast: deferred  Rectal: deferred  Genitalia:  deferred    LABS:    CBC with Differential:    Lab Results   Component Value Date    WBC 5.9 04/29/2019    RBC 4.10 04/29/2019    HGB 12.7 04/29/2019    HCT 37.6 04/29/2019     04/29/2019    MCV 91.7 04/29/2019    MCH 31.0 04/29/2019    MCHC 33.8 04/29/2019    RDW 13.1 04/29/2019    LYMPHOPCT 20.9 04/28/2019    MONOPCT 12.2 04/28/2019    BASOPCT 0.1 04/28/2019    MONOSABS 0.98 04/28/2019    LYMPHSABS 1.72 04/28/2019    EOSABS 0.00 04/28/2019    BASOSABS 0.00 04/28/2019     CMP:    Lab Results   Component Value Date     04/29/2019    K 3.9 04/29/2019     04/29/2019    CO2 22 04/29/2019    BUN 13 04/29/2019    CREATININE 0.9 04/29/2019    GFRAA >60 04/29/2019    LABGLOM >60 04/29/2019    GLUCOSE 94 04/29/2019    PROT 6.5 07/08/2012    LABALBU 3.7 07/08/2012    CALCIUM 8.6 04/29/2019    BILITOT 0.4 07/08/2012    ALKPHOS 105 07/08/2012    AST 18 07/08/2012    ALT 18 07/08/2012     Magnesium:  No results found for: MG  Phosphorus:  No results found for: PHOS  HgBA1c:  No results found for: LABA1C  FLP:  No results found for: TRIG, HDL, LDLCALC, LDLDIRECT, LABVLDL  TSH:    Lab Results   Component Value Date    TSH 10.670 04/28/2019       CTA CHEST W CONTRAST   Final Result      1. No evidence of pulmonary embolism. 2. Patchy bibasilar airspace opacities suggestive of infection. 3. Small hiatal hernia                  XR CHEST PORTABLE   Final Result   1. Bibasilar airspace disease either reflective of   infiltrate/pneumonia and/or atelectasis. ASSESSMENT:      Principal Problem:    Acute respiratory failure with hypoxia (HCC)  Active Problems:    Hypothyroid    Human metapneumovirus (hMPV) pneumonia    Palpitations  Resolved Problems:    * No resolved hospital problems. *      PLAN:    Admit  O2  Nebs  Steroids  procalcitonin  No Abx for viral infection   Medications for other co morbidities cont as appropriate w dosage adjustments as necessary   PT/OT  DVT PPx  DC planning        Electronically signed by Ricky Barroso MD on 4/29/2019 at 9:42 AM

## 2019-04-29 NOTE — H&P
Lethia Child  Dr. Victor M Kirk M.D. History and Physical      CHIEF COMPLAINT:  Fevers sob, weak    Reason for Admission:  Cap and resp failure     History Obtained From: patient   HISTORY OF PRESENT ILLNESS:      The patient is a 63 y.o. female of Mohsen Scott MD with significant past medical history of hypothyroidism - not taking meds anymore ,  who presents with complaints of cough , sob, light fevers at home. She went to urgent care and was give po prednisone and levaquin. She continued to worsen. She therefore came to ED . She is noted to have diffuse opacities on CTA chest - no PE ,  and positive for meta pneumovirus. Admitted for further eval/tx. /61   Pulse 76   Temp 98.1 °F (36.7 °C) (Temporal)   Resp 16   Ht 5' 2\" (1.575 m)   Wt 207 lb 1.6 oz (93.9 kg)   LMP 06/24/2012   SpO2 97%   BMI 37.88 kg/m²     Past Medical History:        Diagnosis Date    Thyroid disease      Past Surgical History:        Procedure Laterality Date    BACK SURGERY      ECHO COMPL W DOP COLOR FLOW  7/8/2012              Medications Prior to Admission:    Medications Prior to Admission: predniSONE (DELTASONE) 10 MG tablet, Take 10 mg by mouth daily Tapering dose  levofloxacin (LEVAQUIN) 750 MG tablet, Take 750 mg by mouth daily  albuterol (PROVENTIL HFA) 108 (90 BASE) MCG/ACT inhaler, Inhale 2 puffs into the lungs every 6 hours as needed for Wheezing  Melatonin 3 MG TABS, Take 3 mg by mouth nightly. Levothyroxine Sodium (SYNTHROID PO), Take 25 mcg by mouth     Allergies:  Dilaudid [hydromorphone hcl]    Social History:   TOBACCO:   reports that she has never smoked. She has never used smokeless tobacco.  ETOH:   reports that she drinks alcohol. MARITAL STATUS:    OCCUPATION:      Family History:   History reviewed. No pertinent family history.     REVIEW OF SYSTEMS:    General ROS: positive for  - chills, fatigue and malaise, cough   Hematological and Lymphatic ROS: negative  Endocrine ROS: negative  Respiratory ROS: no cough, shortness of breath, or wheezing  Cardiovascular ROS: no chest pain or dyspnea on exertion  Gastrointestinal ROS: no abdominal pain, change in bowel habits, or black or bloody stools  Genito-Urinary ROS: no dysuria, trouble voiding, or hematuria  Neurological ROS: no TIA or stroke symptoms  negative    Vitals:  /61   Pulse 76   Temp 98.1 °F (36.7 °C) (Temporal)   Resp 16   Ht 5' 2\" (1.575 m)   Wt 207 lb 1.6 oz (93.9 kg)   LMP 06/24/2012   SpO2 97%   BMI 37.88 kg/m²     PHYSICAL EXAM:  General:  Awake, alert, oriented X 3. Well developed, well nourished, well groomed. No apparent distress. HEENT:  Normocephalic, atraumatic. Pupils equal, round, reactive to light. No scleral icterus. No conjunctival injection. Normal lips, teeth, and gums. No nasal discharge. Neck:  Supple  Heart:  RRR, no murmurs, gallops, rubs, carotid upstroke normal, no carotid bruits  Lungs:  Coarse bl rhionchi in lung fields   Abdomen: Bowel sounds present, soft, nontender, no masses, no organomegaly, no peritoneal signs  Extremities:  No clubbing, cyanosis, or edema  Skin:  Warm and dry, no open lesions or rash  Neuro:  Cranial nerves 2-12 intact, no focal deficits  Breast: deferred  Rectal: deferred  Genitalia:  deferred      DATA:     Recent Labs     04/28/19  1050 04/29/19  0702   WBC 8.2 5.9   HGB 13.3 12.7    206     Recent Labs     04/28/19  1050 04/29/19  0702    142   K 3.2* 3.9   BUN 17 13   CREATININE 0.9 0.9     No results for input(s): PROT, INR in the last 72 hours. No results for input(s): AST, ALT, ALKPHOS, BILIDIR, BILITOT in the last 72 hours. No results for input(s): BNP in the last 72 hours.   Recent Labs     04/28/19  1050   TROPONINI <0.01       ASSESSMENT:      Principal Problem:    Acute respiratory failure with hypoxia (HCC)  Active Problems:    Hypothyroid    Human metapneumovirus (hMPV) pneumonia    Palpitations  Resolved Problems:    * No

## 2019-04-29 NOTE — PROGRESS NOTES
OCCUPATIONAL THERAPY INITIAL EVALUATION      Date:2019  Patient Name: Bill Elder  MRN: [id number]  : 1960  Room: [address] [place], [place] [address]    Evaluating OT: JOHN Nino OTR/L [id number]    AM-PAC Daily Activity Raw Score:   Recommended Adaptive Equipment: TBD     Diagnosis: acute respiratory failure   Surgery: n/a   Pertinent Medical History: thyroid disease   Precautions:  Falls, contact/droplet, O2 (with no prior use at home)     Home Living: Pt lives  in a 1 story home with 5 step(s) to enter and 0 rail(s); bed/bath on main floor; laundry in basement with 20 steps down  Bathroom setup: tub/shower unit   Equipment owned: none  Prior Level of Function: IND with ADLs , IND with IADLs; using no AD for functional mobility   Driving: yes  Occupation: guidance secretary    Pain Level: 0/10  Cognition: A&O: 4/4; Follows multi step directions   Memory:  good    Sequencing:  good    Problem solving:  good    Judgement/safety:  good      Functional Assessment:   Initial Eval Status  Date: 19     Feeding IND      Grooming Mod I (standing at sink for hand hygiene)      UB Dressing IND      LB Dressing IND (pt able to doff/edwin B socks EOB using crossing of leg technique)     Bathing Mod I     Toileting Mod I     Bed Mobility  Log roll: IND  Supine to sit: IND   Sit to supine: IND      Functional Transfers Sit to stand:IND   Stand to sit:IND  Stand pivot: IND  Commode: Mod I     Functional Mobility Mod I     Balance Sitting:     Static:  IND    Dynamic:IND  Standing: Mod I     Activity Tolerance good     Visual/  Perceptual Glasses: yes             Hand dominance: L  UE ROM: RUE:  WFL  LUE:  WFL  Strength: RUE: grossly 5/5 LUE: grossly 5/5   Strength: B WFL  Fine Motor Coordination:  WFL    Hearing: WFL  Sensation: c/o numbness or tingling in L hand occassionally  Tone:  WFL  Edema: none noted                            Comments/Treatment: Cleared by RN to see pt.  Upon arrival, patient sitting upright in

## 2019-04-29 NOTE — PROGRESS NOTES
[address] [facility]  Physical Therapy Initial Evaluation  Name: Renato Robles  : 1960  MRN: [id number]    Date of Service: 2019    Evaluating Therapist: Renate Bray, PT, DPT  [id number]    Room #: 6726/7580-A  DIAGNOSIS: Acute respiratory failure  PRECAUTIONS: Low fall risk, Contact and droplet isolation, O2    Social:  Pt lives  in a 1 story home with 5 stairs and 0 rail/s to enter. There are 20 steps and 1 rail/s to basement laundry. Pt was Independent for amb and works full time. Initial Evaluation  Date:    Was pt agreeable to Eval/treatment? Yes   Does pt have pain? no   Bed Mobility  Rolling: Independent   Supine to sit: Independent   Sit to supine: Independent   Scooting: Independent    Transfers Sit to stand: Independent   Stand to sit: Independent   Stand pivot: Independent    Ambulation   100 feet using no AD  with Garza   Stair negotiation:  NT    ROM RLE: WFL  LLE: WFL   Strength RLE: WFL  LLE: WFL   Balance   Sitting: Independent   Standing: Independent    AM-PAC Basic Mobility Inpatient Short Form Raw Score:  24/24     Patient is A&Ox4. Sensation: NT  Coordination: NT  Edema: None noted     ASSESSMENT  Pt displays functional ability as noted in the objective portion of this evaluation. Comments/Treatment:  Pt was cleared by RN prior to PT evaluation. Pt was received supine and was agreeable to PT evaluation. Pt amb with steady gait and good gait speed. Pt denied feeling unsteady or having any functional mobility deficits. Pt educated on PLB and benefits of OOB activity. Pt was left sitting up in bed with call button within reach and all needs met. Patient education  Pt educated on PT role, pursed lip breathing and preventing iatrogenic effects. Patient response to education:   Pt verbalized understanding Pt demonstrated skill Pt requires further education in this area   Yes       Pts/ family goals   1. To return home.      Patient and or family understand(s) diagnosis, prognosis, and plan of care. PLAN  Patient does not demonstrate any skilled PT needs at this time and will be discharged from PT services.       Time in: 1015  Time out: 304 Tani Milligan, JOSE A  License [id number]

## 2019-04-29 NOTE — PLAN OF CARE
Problem: Physical Regulation:  Goal: Prevent transmision of infection  Description  Prevent transmision of infection  Outcome: Ongoing

## 2019-04-29 NOTE — CARE COORDINATION
4/29/2019social work:discharge planning   Initial assessment done with patient. Patient lives with her  and has no hhc and jsut received a nebulizer. Her dr is dr Yocasta Euceda. Discussed discharge plan/transition of care and sw role. Plan is home with family and no needs. She states she drove herself here and will drive herself home.  Will follow and assist.

## 2019-04-30 LAB
ANION GAP SERPL CALCULATED.3IONS-SCNC: 14 MMOL/L (ref 7–16)
BUN BLDV-MCNC: 13 MG/DL (ref 6–20)
CALCIUM SERPL-MCNC: 9.1 MG/DL (ref 8.6–10.2)
CHLORIDE BLD-SCNC: 107 MMOL/L (ref 98–107)
CK MB: 1.8 NG/ML (ref 0–4.3)
CO2: 21 MMOL/L (ref 22–29)
CREAT SERPL-MCNC: 0.7 MG/DL (ref 0.5–1)
GFR AFRICAN AMERICAN: >60
GFR NON-AFRICAN AMERICAN: >60 ML/MIN/1.73
GLUCOSE BLD-MCNC: 162 MG/DL (ref 74–99)
HCT VFR BLD CALC: 36.7 % (ref 34–48)
HEMOGLOBIN: 12.5 G/DL (ref 11.5–15.5)
MCH RBC QN AUTO: 31.3 PG (ref 26–35)
MCHC RBC AUTO-ENTMCNC: 34.1 % (ref 32–34.5)
MCV RBC AUTO: 91.8 FL (ref 80–99.9)
PDW BLD-RTO: 12.9 FL (ref 11.5–15)
PLATELET # BLD: 230 E9/L (ref 130–450)
PMV BLD AUTO: 12.4 FL (ref 7–12)
POTASSIUM SERPL-SCNC: 4 MMOL/L (ref 3.5–5)
RBC # BLD: 4 E12/L (ref 3.5–5.5)
SODIUM BLD-SCNC: 142 MMOL/L (ref 132–146)
TOTAL CK: 167 U/L (ref 20–180)
TROPONIN: <0.01 NG/ML (ref 0–0.03)
WBC # BLD: 7.4 E9/L (ref 4.5–11.5)

## 2019-04-30 PROCEDURE — 94640 AIRWAY INHALATION TREATMENT: CPT

## 2019-04-30 PROCEDURE — 82550 ASSAY OF CK (CPK): CPT

## 2019-04-30 PROCEDURE — 87206 SMEAR FLUORESCENT/ACID STAI: CPT

## 2019-04-30 PROCEDURE — 2060000000 HC ICU INTERMEDIATE R&B

## 2019-04-30 PROCEDURE — 87070 CULTURE OTHR SPECIMN AEROBIC: CPT

## 2019-04-30 PROCEDURE — 36415 COLL VENOUS BLD VENIPUNCTURE: CPT

## 2019-04-30 PROCEDURE — 82553 CREATINE MB FRACTION: CPT

## 2019-04-30 PROCEDURE — 99254 IP/OBS CNSLTJ NEW/EST MOD 60: CPT | Performed by: INTERNAL MEDICINE

## 2019-04-30 PROCEDURE — 80048 BASIC METABOLIC PNL TOTAL CA: CPT

## 2019-04-30 PROCEDURE — 2700000000 HC OXYGEN THERAPY PER DAY

## 2019-04-30 PROCEDURE — 6370000000 HC RX 637 (ALT 250 FOR IP): Performed by: INTERNAL MEDICINE

## 2019-04-30 PROCEDURE — 2580000003 HC RX 258: Performed by: INTERNAL MEDICINE

## 2019-04-30 PROCEDURE — 85027 COMPLETE CBC AUTOMATED: CPT

## 2019-04-30 PROCEDURE — 6360000002 HC RX W HCPCS: Performed by: INTERNAL MEDICINE

## 2019-04-30 PROCEDURE — 93005 ELECTROCARDIOGRAM TRACING: CPT | Performed by: INTERNAL MEDICINE

## 2019-04-30 PROCEDURE — 84484 ASSAY OF TROPONIN QUANT: CPT

## 2019-04-30 RX ORDER — METHYLPREDNISOLONE SODIUM SUCCINATE 40 MG/ML
40 INJECTION, POWDER, LYOPHILIZED, FOR SOLUTION INTRAMUSCULAR; INTRAVENOUS EVERY 12 HOURS
Status: DISCONTINUED | OUTPATIENT
Start: 2019-04-30 | End: 2019-05-02 | Stop reason: HOSPADM

## 2019-04-30 RX ADMIN — IPRATROPIUM BROMIDE AND ALBUTEROL SULFATE 1 AMPULE: .5; 3 SOLUTION RESPIRATORY (INHALATION) at 11:31

## 2019-04-30 RX ADMIN — GUAIFENESIN 400 MG: 400 TABLET ORAL at 13:48

## 2019-04-30 RX ADMIN — ENOXAPARIN SODIUM 40 MG: 40 INJECTION SUBCUTANEOUS at 08:54

## 2019-04-30 RX ADMIN — LEVOTHYROXINE SODIUM 50 MCG: 50 TABLET ORAL at 06:53

## 2019-04-30 RX ADMIN — Medication 10 ML: at 23:06

## 2019-04-30 RX ADMIN — IPRATROPIUM BROMIDE AND ALBUTEROL SULFATE 1 AMPULE: .5; 3 SOLUTION RESPIRATORY (INHALATION) at 15:58

## 2019-04-30 RX ADMIN — Medication 10 ML: at 08:54

## 2019-04-30 RX ADMIN — IPRATROPIUM BROMIDE AND ALBUTEROL SULFATE 1 AMPULE: .5; 3 SOLUTION RESPIRATORY (INHALATION) at 08:27

## 2019-04-30 RX ADMIN — MONTELUKAST SODIUM 10 MG: 10 TABLET, FILM COATED ORAL at 20:51

## 2019-04-30 RX ADMIN — METHYLPREDNISOLONE SODIUM SUCCINATE 40 MG: 40 INJECTION, POWDER, FOR SOLUTION INTRAMUSCULAR; INTRAVENOUS at 23:06

## 2019-04-30 RX ADMIN — METHYLPREDNISOLONE SODIUM SUCCINATE 40 MG: 40 INJECTION, POWDER, FOR SOLUTION INTRAMUSCULAR; INTRAVENOUS at 02:59

## 2019-04-30 RX ADMIN — IPRATROPIUM BROMIDE AND ALBUTEROL SULFATE 1 AMPULE: .5; 3 SOLUTION RESPIRATORY (INHALATION) at 20:18

## 2019-04-30 RX ADMIN — Medication 3 MG: at 20:51

## 2019-04-30 RX ADMIN — GUAIFENESIN 400 MG: 400 TABLET ORAL at 08:54

## 2019-04-30 RX ADMIN — SODIUM CHLORIDE: 9 INJECTION, SOLUTION INTRAVENOUS at 08:54

## 2019-04-30 RX ADMIN — BENZONATATE 100 MG: 100 CAPSULE ORAL at 06:53

## 2019-04-30 RX ADMIN — SODIUM CHLORIDE: 9 INJECTION, SOLUTION INTRAVENOUS at 23:23

## 2019-04-30 RX ADMIN — METHYLPREDNISOLONE SODIUM SUCCINATE 40 MG: 40 INJECTION, POWDER, FOR SOLUTION INTRAMUSCULAR; INTRAVENOUS at 10:17

## 2019-04-30 RX ADMIN — GUAIFENESIN 400 MG: 400 TABLET ORAL at 20:51

## 2019-04-30 ASSESSMENT — PAIN SCALES - GENERAL
PAINLEVEL_OUTOF10: 0

## 2019-04-30 NOTE — CONSULTS
INPATIENT CARDIOLOGY CONSULT    Name: Shelbi William    Age: 63 y.o. Date of Admission: 4/28/2019 10:11 AM    Date of Service: 4/30/2019    Reason for Consultation: Palpitations    Referring Physician: Dane Solis MD    History of Present Illness:  Shelbi William is a 63 y.o. female (new to [facility]) who presented on 4/28/2019 for further evaluation of SOB, fever, and cough --> she was diagnosed with PNA/human metapneumovirus. Her PMH is outlined in detail below (see A/P below). Prior to her recent illness, she was routinely active with no complaints of chest pain, SOB, palpitations, lightheadedness, dizziness, or syncope. No history of PND or orthopnea. Cardiology consulted d/t complaint of palpitations overnight (no new sustained arrhythmias, +occasional isolated PVC's). She is currently with no active cardiac complaints at rest. She has not been compliant taking synthroid as prescribed as an outpatient. SR on EKG and telemetry.     Review of Systems:   Cardiac: As per HPI  General: No fever, chills  Pulmonary: As per HPI  HEENT: No visual disturbances, difficult swallowing  GI: No nausea, vomiting  Endocrine: +hypothyroidism, no DM  Musculoskeletal: BRANTLEY x 4, no focal motor deficits  Skin: Intact, no rashes  Neuro/Psych: No headache or seizures    Past Medical History:  Past Medical History:   Diagnosis Date    Thyroid disease        Past Surgical History:  Past Surgical History:   Procedure Laterality Date    BACK SURGERY      ECHO COMPL W DOP COLOR FLOW  7/8/2012          Family History:  [address] CAD (father)    Social History:  Social History     Socioeconomic History    Marital status:      Spouse name: Not on file    Number of children: Not on file    Years of education: Not on file    Highest education level: Not on file   Occupational History    Not on file   Social Needs    Financial resource strain: Not on file    Food insecurity:     Worry: Not on file     Inability: Not on Estefanía Billings MD        levothyroxine (SYNTHROID) tablet 50 mcg  50 mcg Oral Daily Eneida Enamorado MD   50 mcg at 04/30/19 3450    montelukast (SINGULAIR) tablet 10 mg  10 mg Oral Nightly Eneida Enamorado MD   10 mg at 04/29/19 2024    sodium chloride flush 0.9 % injection 10 mL  10 mL Intravenous PRN Eneida Enamorado MD        acetaminophen (TYLENOL) tablet 650 mg  650 mg Oral Q4H PRN Eneida Enamorado MD        melatonin tablet 3 mg  3 mg Oral Nightly Eneida Enamorado MD   3 mg at 04/29/19 2024    0.9 % sodium chloride infusion   Intravenous Continuous Eneida Enamorado MD 75 mL/hr at 04/30/19 0854      sodium chloride flush 0.9 % injection 10 mL  10 mL Intravenous 2 times per day Eneida Enamorado MD   10 mL at 04/30/19 0854    sodium chloride flush 0.9 % injection 10 mL  10 mL Intravenous PRN Eneida Enamorado MD        magnesium hydroxide (MILK OF MAGNESIA) 400 MG/5ML suspension 30 mL  30 mL Oral Daily PRN Eneida Enamorado MD        ondansetron [facility]) injection 4 mg  4 mg Intravenous Q6H PRN Eneida Enamorado MD   4 mg at 04/29/19 1027    enoxaparin (LOVENOX) injection 40 mg  40 mg Subcutaneous Daily Eneida Enamorado MD   40 mg at 04/30/19 0854    ipratropium-albuterol (DUONEB) nebulizer solution 1 ampule  1 ampule Inhalation Q4H Katarina Woods MD   1 ampule at 04/30/19 1558    benzonatate (TESSALON) capsule 100 mg  100 mg Oral TID PRN Eneida Enamorado MD   100 mg at 04/30/19 0653    guaiFENesin-dextromethorphan (ROBITUSSIN DM) 100-10 MG/5ML syrup 5 mL  5 mL Oral Q4H PRN Eneida Enamorado MD   5 mL at 04/29/19 0630    guaiFENesin tablet 400 mg  400 mg Oral TID Eneida Enamorado MD   400 mg at 04/30/19 1348      sodium chloride 75 mL/hr at 04/30/19 0854       Physical Exam:  BP (!) 124/59   Pulse 102   Temp 97.8 °F (36.6 °C) (Temporal)   Resp 16   Ht 5' 2\" (1.575 m)   Wt 208 lb 8 oz (94.6 kg)   LMP 06/24/2012   SpO2 93%   BMI 38.14 kg/m²   Wt Readings from Last 3 Encounters:   04/30/19 208 lb 8 oz (94.6 kg)   10/24/18 195 lb (88.5 kg)     Appearance: Awake, alert, no acute respiratory distress  Skin: Intact, no rash  Head: Normocephalic, atraumatic  Eyes: EOMI, no conjunctival erythema  ENMT: No pharyngeal erythema, MMM, no rhinorrhea  Neck: Supple, no elevated JVP, no carotid bruits  Lungs: Decreased BS B/L, no wheezing  Cardiac: Regular rate and rhythm, +S1S2, no murmurs apparent  Abdomen: Soft, nontender, +bowel sounds  Extremities: Moves all extremities x 4, no lower extremity edema  Neurologic: No focal motor deficits apparent, normal mood and affect    Intake/Output:    Intake/Output Summary (Last 24 hours) at 4/30/2019 1834  Last data filed at 4/30/2019 1748  Gross per 24 hour   Intake 2286 ml   Output 3275 ml   Net -989 ml     I/O this shift:  In: -   Out: 850 [Urine:850]    Laboratory Tests:  Recent Labs     04/28/19  1050 04/29/19  0702 04/30/19  0511    142 142   K 3.2* 3.9 4.0    106 107   CO2 21* 22 21*   BUN 17 13 13   CREATININE 0.9 0.9 0.7   GLUCOSE 101* 94 162*   CALCIUM 9.2 8.6 9.1     Recent Labs     04/28/19  1050 04/29/19  0702 04/30/19  0511   WBC 8.2 5.9 7.4   RBC 4.28 4.10 4.00   HGB 13.3 12.7 12.5   HCT 39.0 37.6 36.7   MCV 91.1 91.7 91.8   MCH 31.1 31.0 31.3   MCHC 34.1 33.8 34.1   RDW 12.9 13.1 12.9    206 230   MPV 12.0 12.1* 12.4*     Lab Results   Component Value Date    CKTOTAL 167 04/30/2019    CKMB 1.8 04/30/2019    TROPONINI <0.01 04/30/2019    TROPONINI <0.01 04/28/2019    TROPONINI 0.03 07/07/2012     Lab Results   Component Value Date    TSH 10.670 (H) 04/28/2019     Cardiac Tests:  ECG: SR, no acute STT changes    Telemetry findings reviewed: SR, occasional isolated PVC's    Echocardiogram: 7/8/12 ([place]isty)    Left ventricle grossly normal in size. Normal LV segmental wall motion. Normal left ventricular wall thickness. Estimated left ventricular ejection fraction is 65 %. Normal right ventricular size and function. Physiologic and/or trace mitral regurgitation is present.     Physiologic and/or trace tricuspid regurgitation. Technically good quality study. No comparison study available. [facility] nuclear stress test:  7/2012  1.  No evidence for reversible ischemia or infarction.  The study is   within normal limits. 2.  The ejection fraction is 72%. 3.  No significant wall motion abnormalities are noted. CTA chest: 4/28/19  1. No evidence of pulmonary embolism.       2. Patchy bibasilar airspace opacities suggestive of infection.        3. Small hiatal hernia       ASSESSMENT / PLAN:  1. Palpitations  2. Pneumonia / positive human metapneumovirus (4/28/19)  3. Hypothyroidism -- non-compliant taking synthroid as an outpatient (resumed this admission); elevated TSH with normal free T4  4. Hypokalemia -- improved  5. [facility] of CAD (father)  10. BMI 38.1  7. Isolated PVC's    - Echocardiogram  - Keep K > 4 and Mg > 2  - Telemetry reviewed --> no new sustained arrhythmias, +occasional isolated PVC's  - Continue current medications    Thank you for allowing me to participate in your patient's care. Please feel free to contact me if you have any questions or concerns.     Akosua Hwang MD  [facility]) Cardiology

## 2019-04-30 NOTE — PROGRESS NOTES
Subjective: The patient is awake and alert. Feels well  No acute issues  palpitations overnight   No acute issues on monitor     Objective:    BP (!) 119/56   Pulse 98   Temp 97.1 °F (36.2 °C) (Temporal)   Resp 16   Ht 5' 2\" (1.575 m)   Wt 208 lb 8 oz (94.6 kg)   LMP 06/24/2012   SpO2 93%   BMI 38.14 kg/m²     In: 0551 [P.O.:840; I.V.:846]  Out: 2225     HEENT: NCAT,  PERRLA, No JVD  Heart:  RRR, no murmurs, gallops, or rubs.   Lungs:  nsr CTA bilaterally, no wheeze, rales or rhonchi  Abd: bowel sounds present, nontender, nondistended, no masses  Extrem:  No clubbing, cyanosis, or edema     Recent Labs     04/28/19  1050 04/29/19  0702 04/30/19  0511   WBC 8.2 5.9 7.4   HGB 13.3 12.7 12.5   HCT 39.0 37.6 36.7    206 230       Recent Labs     04/28/19  1050 04/29/19  0702 04/30/19  0511    142 142   K 3.2* 3.9 4.0    106 107   CO2 21* 22 21*   BUN 17 13 13   CREATININE 0.9 0.9 0.7   CALCIUM 9.2 8.6 9.1       Assessment:    Patient Active Problem List   Diagnosis    Hypothyroid    Chest pain    Acute respiratory failure with hypoxia (HCC)    Human metapneumovirus (hMPV) pneumonia    Palpitations       Plan:    Admitted to tele for evaluation of chest pain and SOB   Iv steroids - taper   Continue abx therapy - will transition to po on dc   Nebs as ordered   Await cx    Palpations  Pt on duoneb and was started on synthroid yesterday  For tsh >10   Cards consulted overnight   Echo pending today     DVT Proph  Pt/ot Cheikh Rachel MD  1:04 PM  4/30/2019

## 2019-05-01 LAB
ANION GAP SERPL CALCULATED.3IONS-SCNC: 12 MMOL/L (ref 7–16)
BUN BLDV-MCNC: 17 MG/DL (ref 6–20)
CALCIUM SERPL-MCNC: 9 MG/DL (ref 8.6–10.2)
CHLORIDE BLD-SCNC: 105 MMOL/L (ref 98–107)
CO2: 23 MMOL/L (ref 22–29)
CREAT SERPL-MCNC: 0.8 MG/DL (ref 0.5–1)
EKG ATRIAL RATE: 79 BPM
EKG P AXIS: 51 DEGREES
EKG P-R INTERVAL: 140 MS
EKG Q-T INTERVAL: 382 MS
EKG QRS DURATION: 78 MS
EKG QTC CALCULATION (BAZETT): 438 MS
EKG R AXIS: 42 DEGREES
EKG T AXIS: 42 DEGREES
EKG VENTRICULAR RATE: 79 BPM
GFR AFRICAN AMERICAN: >60
GFR NON-AFRICAN AMERICAN: >60 ML/MIN/1.73
GLUCOSE BLD-MCNC: 121 MG/DL (ref 74–99)
LV EF: 70 %
LVEF MODALITY: NORMAL
MAGNESIUM: 2.2 MG/DL (ref 1.6–2.6)
POTASSIUM SERPL-SCNC: 4 MMOL/L (ref 3.5–5)
SODIUM BLD-SCNC: 140 MMOL/L (ref 132–146)

## 2019-05-01 PROCEDURE — 36415 COLL VENOUS BLD VENIPUNCTURE: CPT

## 2019-05-01 PROCEDURE — 6370000000 HC RX 637 (ALT 250 FOR IP): Performed by: INTERNAL MEDICINE

## 2019-05-01 PROCEDURE — 80048 BASIC METABOLIC PNL TOTAL CA: CPT

## 2019-05-01 PROCEDURE — 2700000000 HC OXYGEN THERAPY PER DAY

## 2019-05-01 PROCEDURE — 93306 TTE W/DOPPLER COMPLETE: CPT

## 2019-05-01 PROCEDURE — 2060000000 HC ICU INTERMEDIATE R&B

## 2019-05-01 PROCEDURE — 83735 ASSAY OF MAGNESIUM: CPT

## 2019-05-01 PROCEDURE — 99232 SBSQ HOSP IP/OBS MODERATE 35: CPT | Performed by: INTERNAL MEDICINE

## 2019-05-01 PROCEDURE — 6360000002 HC RX W HCPCS: Performed by: INTERNAL MEDICINE

## 2019-05-01 PROCEDURE — 2580000003 HC RX 258: Performed by: INTERNAL MEDICINE

## 2019-05-01 PROCEDURE — 94640 AIRWAY INHALATION TREATMENT: CPT

## 2019-05-01 RX ADMIN — SODIUM CHLORIDE: 9 INJECTION, SOLUTION INTRAVENOUS at 07:12

## 2019-05-01 RX ADMIN — IPRATROPIUM BROMIDE AND ALBUTEROL SULFATE 1 AMPULE: .5; 3 SOLUTION RESPIRATORY (INHALATION) at 08:51

## 2019-05-01 RX ADMIN — ENOXAPARIN SODIUM 40 MG: 40 INJECTION SUBCUTANEOUS at 08:01

## 2019-05-01 RX ADMIN — BENZONATATE 100 MG: 100 CAPSULE ORAL at 11:08

## 2019-05-01 RX ADMIN — LEVOTHYROXINE SODIUM 50 MCG: 50 TABLET ORAL at 07:12

## 2019-05-01 RX ADMIN — Medication 3 MG: at 22:06

## 2019-05-01 RX ADMIN — METHYLPREDNISOLONE SODIUM SUCCINATE 40 MG: 40 INJECTION, POWDER, FOR SOLUTION INTRAMUSCULAR; INTRAVENOUS at 10:05

## 2019-05-01 RX ADMIN — IPRATROPIUM BROMIDE AND ALBUTEROL SULFATE 1 AMPULE: .5; 3 SOLUTION RESPIRATORY (INHALATION) at 21:17

## 2019-05-01 RX ADMIN — Medication 10 ML: at 08:01

## 2019-05-01 RX ADMIN — GUAIFENESIN 400 MG: 400 TABLET ORAL at 08:00

## 2019-05-01 RX ADMIN — IPRATROPIUM BROMIDE AND ALBUTEROL SULFATE 1 AMPULE: .5; 3 SOLUTION RESPIRATORY (INHALATION) at 12:11

## 2019-05-01 RX ADMIN — BENZONATATE 100 MG: 100 CAPSULE ORAL at 19:46

## 2019-05-01 RX ADMIN — GUAIFENESIN 400 MG: 400 TABLET ORAL at 14:29

## 2019-05-01 RX ADMIN — IPRATROPIUM BROMIDE AND ALBUTEROL SULFATE 1 AMPULE: .5; 3 SOLUTION RESPIRATORY (INHALATION) at 16:10

## 2019-05-01 RX ADMIN — GUAIFENESIN 400 MG: 400 TABLET ORAL at 19:46

## 2019-05-01 RX ADMIN — METHYLPREDNISOLONE SODIUM SUCCINATE 40 MG: 40 INJECTION, POWDER, FOR SOLUTION INTRAMUSCULAR; INTRAVENOUS at 19:46

## 2019-05-01 RX ADMIN — MONTELUKAST SODIUM 10 MG: 10 TABLET, FILM COATED ORAL at 19:46

## 2019-05-01 ASSESSMENT — PAIN SCALES - GENERAL
PAINLEVEL_OUTOF10: 0

## 2019-05-01 NOTE — PROGRESS NOTES
Subjective: The patient is awake and alert. Feels better   No acute issues overnight   Slept well  improving daily      Objective:    /74   Pulse 93   Temp 97.6 °F (36.4 °C) (Temporal)   Resp 18   Ht 5' 2\" (1.575 m)   Wt 210 lb 11.2 oz (95.6 kg)   LMP 06/24/2012   SpO2 95%   BMI 38.54 kg/m²     In: 1187 [P.O.:360; I.V.:827]  Out: 2150     HEENT: NCAT,  PERRLA, No JVD  Heart:  RRR, no murmurs, gallops, or rubs.   Lungs:  nsr CTA bilaterally, no wheeze, rales or rhonchi  Abd: bowel sounds present, nontender, nondistended, no masses  Extrem:  No clubbing, cyanosis, or edema     Recent Labs     04/29/19  0702 04/30/19  0511   WBC 5.9 7.4   HGB 12.7 12.5   HCT 37.6 36.7    230       Recent Labs     04/29/19  0702 04/30/19  0511    142   K 3.9 4.0    107   CO2 22 21*   BUN 13 13   CREATININE 0.9 0.7   CALCIUM 8.6 9.1       Assessment:    Patient Active Problem List   Diagnosis    Hypothyroid    Chest pain    Acute respiratory failure with hypoxia (HCC)    Human metapneumovirus (hMPV) pneumonia    Palpitations       Plan:    Admitted to [facility] for evaluation of chest pain and SOB   Iv steroids - taper to po tomorrow if better   Continue abx therapy - will transition to po on dc   Nebs as ordered   Await cx    Palpations  Pt on duoneb and was started on synthroid yesterday  For tsh >10   Cards following   Echo pending unremarkable     DVT Proph  Pt/ot Yina Whitfield MD  11:17 AM  5/1/2019

## 2019-05-01 NOTE — PROGRESS NOTES
insecurity:     Worry: Not on file     Inability: Not on file    Transportation needs:     Medical: Not on file     Non-medical: Not on file   Tobacco Use    Smoking status: Never Smoker    Smokeless tobacco: Never Used   Substance and Sexual Activity    Alcohol use: Yes     Comment: SOCIALLY    Drug use: No    Sexual activity: Not on file   Lifestyle    Physical activity:     Days per week: Not on file     Minutes per session: Not on file    Stress: Not on file   Relationships    Social connections:     Talks on phone: Not on file     Gets together: Not on file     Attends [employer] service: Not on file     Active member of club or organization: Not on file     Attends meetings of clubs or organizations: Not on file     Relationship status: Not on file    Intimate partner violence:     Fear of current or ex partner: Not on file     Emotionally abused: Not on file     Physically abused: Not on file     Forced sexual activity: Not on file   Other Topics Concern    Not on file   Social History Narrative    Not on file       Allergies: Allergies   Allergen Reactions    Dilaudid [Hydromorphone Hcl]        Home Medications:  Prior to Admission medications    Medication Sig Start Date End Date Taking? Authorizing Provider   predniSONE (DELTASONE) 10 MG tablet Take 10 mg by mouth daily Tapering dose   Yes Historical Provider, MD   levofloxacin (LEVAQUIN) 750 MG tablet Take 750 mg by mouth daily   Yes Historical Provider, MD   albuterol (PROVENTIL HFA) 108 (90 BASE) MCG/ACT inhaler Inhale 2 puffs into the lungs every 6 hours as needed for Wheezing 10/20/15 4/28/19 Yes Byron Gil MD   Melatonin 3 MG TABS Take 3 mg by mouth nightly.      Yes Historical Provider, MD   Levothyroxine Sodium (SYNTHROID PO) Take 25 mcg by mouth    Yes Historical Provider, MD       Current Medications:  Current Facility-Administered Medications   Medication Dose Route Frequency Provider Last Rate Last Dose    methylPREDNISolone sodium (SOLU-MEDROL) injection 40 mg  40 mg Intravenous Q12H Richard Mirza MD   40 mg at 05/01/19 1005    levothyroxine (SYNTHROID) tablet 50 mcg  50 mcg Oral Daily Richard Mirza MD   50 mcg at 05/01/19 0271    montelukast (SINGULAIR) tablet 10 mg  10 mg Oral Nightly Richard Mirza MD   10 mg at 04/30/19 2051    sodium chloride flush 0.9 % injection 10 mL  10 mL Intravenous PRN Richard Mirza MD        acetaminophen (TYLENOL) tablet 650 mg  650 mg Oral Q4H PRN Richard Mirza MD        melatonin tablet 3 mg  3 mg Oral Nightly Richard Mirza MD   3 mg at 04/30/19 2051    0.9 % sodium chloride infusion   Intravenous Continuous Richard Mirza MD 75 mL/hr at 05/01/19 9563      sodium chloride flush 0.9 % injection 10 mL  10 mL Intravenous 2 times per day Richard Mirza MD   10 mL at 05/01/19 0801    sodium chloride flush 0.9 % injection 10 mL  10 mL Intravenous PRN Richard Mirza MD   10 mL at 04/30/19 2306    magnesium hydroxide (MILK OF MAGNESIA) 400 MG/5ML suspension 30 mL  30 mL Oral Daily PRN Richard Mirza MD        ondansetron [facility]) injection 4 mg  4 mg Intravenous Q6H PRN Richard Mirza MD   4 mg at 04/29/19 1027    enoxaparin (LOVENOX) injection 40 mg  40 mg Subcutaneous Daily Richard Mirza MD   40 mg at 05/01/19 0801    ipratropium-albuterol (DUONEB) nebulizer solution 1 ampule  1 ampule Inhalation Q4H WA Richard Mirza MD   1 ampule at 05/01/19 7339    benzonatate (TESSALON) capsule 100 mg  100 mg Oral TID PRN Richard Mirza MD   100 mg at 05/01/19 1108    guaiFENesin-dextromethorphan (ROBITUSSIN DM) 100-10 MG/5ML syrup 5 mL  5 mL Oral Q4H PRN Richard Mirza MD   5 mL at 04/29/19 0630    guaiFENesin tablet 400 mg  400 mg Oral TID Richard Mirza MD   400 mg at 05/01/19 0800      sodium chloride 75 mL/hr at 05/01/19 3048       Physical Exam:  /74   Pulse 93   Temp 97.6 °F (36.4 °C) (Temporal)   Resp 18   Ht 5' 2\" (1.575 m)   Wt 210 lb 11.2 oz (95.6 kg)   LMP 06/24/2012   SpO2 95%   BMI 38.54 kg/m²   Wt Readings from Last 3 Encounters:   05/01/19 210 lb 11.2 oz (95.6 kg)   10/24/18 195 lb (88.5 kg)     Appearance: Awake, alert, no acute respiratory distress  Skin: Intact, no rash  Head: Normocephalic, atraumatic  Eyes: EOMI, no conjunctival erythema  ENMT: No pharyngeal erythema, MMM, no rhinorrhea  Neck: Supple, no elevated JVP, no carotid bruits  Lungs: Decreased BS B/L, no wheezing  Cardiac: Regular rate and rhythm, +S1S2, no murmurs apparent  Abdomen: Soft, nontender, +bowel sounds  Extremities: Moves all extremities x 4, no lower extremity edema  Neurologic: No focal motor deficits apparent, normal mood and affect    Intake/Output:    Intake/Output Summary (Last 24 hours) at 5/1/2019 1132  Last data filed at 5/1/2019 0925  Gross per 24 hour   Intake 1547 ml   Output 2350 ml   Net -803 ml     I/O this shift:  In: 360 [P.O.:360]  Out: -     Laboratory Tests:  Recent Labs     04/29/19  0702 04/30/19  0511    142   K 3.9 4.0    107   CO2 22 21*   BUN 13 13   CREATININE 0.9 0.7   GLUCOSE 94 162*   CALCIUM 8.6 9.1     Recent Labs     04/29/19  0702 04/30/19  0511   WBC 5.9 7.4   RBC 4.10 4.00   HGB 12.7 12.5   HCT 37.6 36.7   MCV 91.7 91.8   MCH 31.0 31.3   MCHC 33.8 34.1   RDW 13.1 12.9    230   MPV 12.1* 12.4*     Lab Results   Component Value Date    CKTOTAL 167 04/30/2019    CKMB 1.8 04/30/2019    TROPONINI <0.01 04/30/2019    TROPONINI <0.01 04/28/2019    TROPONINI 0.03 07/07/2012     Lab Results   Component Value Date    TSH 10.670 (H) 04/28/2019     Cardiac Tests:  ECG: SR, no acute STT changes    Telemetry findings reviewed: SR, rare isolated PVC's    Echocardiogram: 7/8/12 (Jay)    Left ventricle grossly normal in size. Normal LV segmental wall motion. Normal left ventricular wall thickness. Estimated left ventricular ejection fraction is 65 %. Normal right ventricular size and function. Physiologic and/or trace mitral regurgitation is present.     Physiologic and/or trace tricuspid regurgitation. Technically good quality study. No comparison study available. [facility] nuclear stress test:  7/2012  1.  No evidence for reversible ischemia or infarction.  The study is   within normal limits. 2.  The ejection fraction is 72%. 3.  No significant wall motion abnormalities are noted. CTA chest: 4/28/19  1. No evidence of pulmonary embolism.       2. Patchy bibasilar airspace opacities suggestive of infection.        3. Small hiatal hernia       ASSESSMENT / PLAN:  1. Palpitations  2. Pneumonia / positive human metapneumovirus (4/28/19)  3. Hypothyroidism -- non-compliant taking synthroid as an outpatient (resumed this admission); elevated TSH with normal free T4  4. Hypokalemia -- improved  5. [facility] of CAD (father)  10. BMI 38.1  7.  Isolated PVC's    - Echocardiogram (pending)  - Keep K > 4 and Mg > 2  - Telemetry reviewed --> no new sustained arrhythmias, rare isolated PVC's  - Continue current medications  - Discharge planning    Justyna Hsu MD  [facility]) Cardiology

## 2019-05-02 VITALS
RESPIRATION RATE: 16 BRPM | BODY MASS INDEX: 38.7 KG/M2 | SYSTOLIC BLOOD PRESSURE: 106 MMHG | OXYGEN SATURATION: 96 % | HEIGHT: 62 IN | HEART RATE: 69 BPM | TEMPERATURE: 97.4 F | DIASTOLIC BLOOD PRESSURE: 66 MMHG | WEIGHT: 210.3 LBS

## 2019-05-02 LAB
CULTURE, RESPIRATORY: NORMAL
SMEAR, RESPIRATORY: NORMAL

## 2019-05-02 PROCEDURE — 6370000000 HC RX 637 (ALT 250 FOR IP): Performed by: INTERNAL MEDICINE

## 2019-05-02 PROCEDURE — 6360000002 HC RX W HCPCS: Performed by: INTERNAL MEDICINE

## 2019-05-02 PROCEDURE — 94640 AIRWAY INHALATION TREATMENT: CPT

## 2019-05-02 RX ORDER — BENZONATATE 100 MG/1
100 CAPSULE ORAL 3 TIMES DAILY PRN
Qty: 30 CAPSULE | Refills: 0 | Status: SHIPPED | OUTPATIENT
Start: 2019-05-02 | End: 2019-05-09

## 2019-05-02 RX ORDER — PREDNISONE 20 MG/1
TABLET ORAL
Qty: 18 TABLET | Refills: 0 | Status: SHIPPED | OUTPATIENT
Start: 2019-05-02 | End: 2022-08-04

## 2019-05-02 RX ORDER — IPRATROPIUM BROMIDE AND ALBUTEROL SULFATE 2.5; .5 MG/3ML; MG/3ML
3 SOLUTION RESPIRATORY (INHALATION)
Qty: 360 ML | Refills: 0 | Status: SHIPPED | OUTPATIENT
Start: 2019-05-02

## 2019-05-02 RX ORDER — MONTELUKAST SODIUM 10 MG/1
10 TABLET ORAL NIGHTLY
Qty: 30 TABLET | Refills: 3 | Status: SHIPPED | OUTPATIENT
Start: 2019-05-02

## 2019-05-02 RX ORDER — LEVOTHYROXINE SODIUM 0.05 MG/1
50 TABLET ORAL DAILY
Qty: 30 TABLET | Refills: 3 | Status: SHIPPED | OUTPATIENT
Start: 2019-05-03

## 2019-05-02 RX ORDER — DOXYCYCLINE HYCLATE 100 MG
100 TABLET ORAL 2 TIMES DAILY
Qty: 14 TABLET | Refills: 0 | Status: SHIPPED | OUTPATIENT
Start: 2019-05-02 | End: 2019-05-09

## 2019-05-02 RX ORDER — CEFDINIR 300 MG/1
300 CAPSULE ORAL 2 TIMES DAILY
Qty: 14 CAPSULE | Refills: 0 | Status: SHIPPED | OUTPATIENT
Start: 2019-05-02 | End: 2019-05-09

## 2019-05-02 RX ADMIN — ENOXAPARIN SODIUM 40 MG: 40 INJECTION SUBCUTANEOUS at 08:10

## 2019-05-02 RX ADMIN — BENZONATATE 100 MG: 100 CAPSULE ORAL at 07:01

## 2019-05-02 RX ADMIN — IPRATROPIUM BROMIDE AND ALBUTEROL SULFATE 1 AMPULE: .5; 3 SOLUTION RESPIRATORY (INHALATION) at 08:16

## 2019-05-02 RX ADMIN — GUAIFENESIN 400 MG: 400 TABLET ORAL at 08:10

## 2019-05-02 RX ADMIN — IPRATROPIUM BROMIDE AND ALBUTEROL SULFATE 1 AMPULE: .5; 3 SOLUTION RESPIRATORY (INHALATION) at 12:04

## 2019-05-02 RX ADMIN — METHYLPREDNISOLONE SODIUM SUCCINATE 40 MG: 40 INJECTION, POWDER, FOR SOLUTION INTRAMUSCULAR; INTRAVENOUS at 10:29

## 2019-05-02 RX ADMIN — GUAIFENESIN 400 MG: 400 TABLET ORAL at 14:36

## 2019-05-02 RX ADMIN — LEVOTHYROXINE SODIUM 50 MCG: 50 TABLET ORAL at 07:01

## 2019-05-02 ASSESSMENT — PAIN SCALES - GENERAL
PAINLEVEL_OUTOF10: 0

## 2019-05-02 NOTE — PLAN OF CARE
Problem: Cardiac Output - Decreased:  Goal: Hemodynamic stability will improve  Description  Hemodynamic stability will improve  Outcome: Met This Shift     Problem: Falls - Risk of:  Goal: Will remain free from falls  Description  Will remain free from falls  Outcome: Met This Shift

## 2019-05-02 NOTE — DISCHARGE INSTR - COC
Continuity of Care Form    Patient Name: Rupinder Ontiveros   :  1960  MRN:  [id number]    Admit date:  2019  Discharge date:  ***    Code Status Order: Full Code   Advance Directives:   Advance Care Flowsheet Documentation     Date/Time Healthcare Directive Type of Healthcare Directive Copy in [address] Agent's Name Healthcare Agent's Phone Number    19 1710  No, patient does not have an advance directive for healthcare treatment -- -- -- -- --          Admitting Physician:  Bishop Joshua MD  PCP: Steph Donovan MD    Discharging Nurse: [facility] Unit/Room#: 1460/2478-H  Discharging Unit Phone Number: ***    Emergency Contact:   Extended Emergency Contact Information  Primary Emergency Contact: Norberto Myers  Address: [address] Phone: [phone]  Relation: Spouse  Secondary Emergency Contact: [address] Phone: [phone]  Relation: Parent    Past Surgical History:  Past Surgical History:   Procedure Laterality Date    BACK SURGERY      ECHO COMPL W DOP COLOR FLOW  2012            Immunization History: There is no immunization history on file for this patient. Active Problems:  Patient Active Problem List   Diagnosis Code    Hypothyroid E03.9    Chest pain R07.9    Acute respiratory failure with hypoxia (HCC) J96.01    Human metapneumovirus (hMPV) pneumonia J12.3    Palpitations R00.2       Isolation/Infection:   Isolation          Contact and Droplet        Patient Infection Status     Infection Encounter Level?  Onset Date Added Added By Resolved Resolved By Review Date    Metapneumoviris Yes 19 Respiratory Panel, Film Array   19          Nurse Assessment:  Last Vital Signs: /66   Pulse 69   Temp 97.4 °F (36.3 °C) (Temporal)   Resp 16   Ht 5' 2\" (1.575 m)   Wt 210 lb 4.8 oz (95.4 kg)   LMP 2012   SpO2 96%   BMI 38.46 kg/m²     Last documented pain score (0-10 scale): Pain Level: 0  Last Weight:   Wt Readings from Last 1 Encounters:   19 210 lb 4.8 oz (95.4 kg)     Mental Status:  {IP PT MENTAL STATUS:}    IV Access:  { CHINTAN IV ACCESS:785348557}    Nursing Mobility/ADLs:  Walking   {CHP DME LERD:042885033}  Transfer  {CHP DME BBER:886231665}  Bathing  {CHP DME NPMN:094989182}  Dressing  {CHP DME UJSY:606375946}  Toileting  {CHP DME PBFM:201271915}  Feeding  {P DME IFUK:745226382}  Med Admin  {P DME PHWD:420003947}  Med Delivery   { CHINTAN MED Delivery:855468762}    Wound Care Documentation and Therapy:        Elimination:  Continence:   · Bowel: {YES / DOUG:08119}  · Bladder: {YES / HARRIS:26321}  Urinary Catheter: {Urinary Catheter:323605001}   Colostomy/Ileostomy/Ileal Conduit: {YES / Y}       Date of Last BM: ***    Intake/Output Summary (Last 24 hours) at 2019 1442  Last data filed at 2019 1402  Gross per 24 hour   Intake 3291 ml   Output 2600 ml   Net 691 ml     I/O last 3 completed shifts: In: 2947.3 [P.O.:1100;  I.V.:1847.3]  Out: 2800 [Urine:2800]    Safety Concerns:     [address] Safety Concerns:765601654}    Impairments/Disabilities:      [place] [place]/Disabilities:286067002}    Nutrition Therapy:  Current Nutrition Therapy:   [address] Diet List:815637177}    Routes of Feeding: {[facility] DME Other Feedings:950447301}  Liquids: {Slp liquid thickness:12492}  Daily Fluid Restriction: {CHP DME Yes amt example:952994625}  Last Modified Barium Swallow with Video (Video Swallowing Test): {Done Not Done [facility]:852933120}    Treatments at the Time of Hospital Discharge:   Respiratory Treatments: ***  Oxygen Therapy:  {Therapy; copd oxygen:06004}  Ventilator:    {[facility] CC Vent CTUQ:541517141}    Rehab Therapies: {THERAPEUTIC INTERVENTION:2956469849}  Weight Bearing Status/Restrictions: {MAC CHAMORRO Weight Bearin}  Other Medical Equipment (for information only, NOT a DME order):

## 2019-05-02 NOTE — DISCHARGE SUMMARY
Physician Discharge Summary     Patient ID:  Yin Brumfield  [id number]  26 y.o.  1960    Admit date: 2019    Discharge date and time: 2019    Admission Diagnoses:   Patient Active Problem List   Diagnosis    Hypothyroid    Chest pain    Acute respiratory failure with hypoxia (HCC)    Human metapneumovirus (hMPV) pneumonia    Palpitations       Discharge Diagnoses: viral bronchitis / palpations     Consults:cards     Procedures:  None     Hospital Course: The patient is a 63 y.o. female of Eneida Doyle MD with significant past medical history of hypothyroidism - not taking meds anymore ,  who presents with complaints of cough , sob, light fevers at home. She went to urgent care and was give po prednisone and levaquin. She continued to worsen. She therefore came to ED . She is noted to have diffuse opacities on CTA chest - no PE ,  and positive for meta pneumovirus. Admitted for further eval/tx. /61   Pulse 76   Temp 98.1 °F (36.7 °C) (Temporal)   Resp 16   Ht 5' 2\" (1.575 m)   Wt 207 lb 1.6 oz (93.9 kg)   LMP 2012   SpO2 97%   BMI 37.88 kg/m²     Pt was placed on iv steroids which were tapered down . She was on supportive care as well. She had palpations and cards was consulted. She can be discharged home in stable condition. follow up with pcp in 1 week . No pe on cta. Recent Labs     19  0511   WBC 7.4   HGB 12.5   HCT 36.7          Recent Labs     19  0511 19  1057    140   K 4.0 4.0    105   CO2 21* 23   BUN 13 17   CREATININE 0.7 0.8   CALCIUM 9.1 9.0       Xr Chest Portable    Result Date: 2019  Patient MRN: [id number] : 1960 Age:  63 years Gender: Female Order Date: 2019 10:30 AM Exam: XR CHEST PORTABLE Number of Views: 1 Indication:   Fever Comparison: 10/19/2015 Findings: There is a normal cardiomediastinal silhouette with airspace disease in the right lung base. No pneumothorax.      1. Bibasilar airspace disease either reflective of infiltrate/pneumonia and/or atelectasis. Cta Chest W Contrast    Result Date: 2019  Patient MRN:  [id number] : 1960 Age: 63 years Gender: Female Order Date:  2019 12:30 PM EXAM: CTA CHEST W CONTRAST NUMBER OF IMAGES:  385 INDICATION:  Rule out PE, hypoxia  COMPARISON: 2019 TECHNIQUE: CTA of the chest was performed after the uneventful administration of 75 mL Isovue 370 intravenous contrast. Coronal MIPS reconstructions were obtained. Dose optimization techniques utilized including automatic exposure control and clarity iterative reconstruction. FINDINGS: There is adequate opacification of the pulmonary arteries with minimal motion artifact. No filling defects identified in the pulmonary arteries. There are degenerative changes in the spine. Thoracoabdominal aorta is normal in course and caliber. There are patchy bibasilar airspace opacities suggestive of infection. Central airways are patent. No pleural effusion or pneumothorax seen. Visualized upper abdominal organs are unremarkable. Heart is normal in size. No pericardial effusion. Small hiatal hernia noted. Thyroid gland is atrophic. No mediastinal, hilar, axillary or supraclavicular lymphadenopathy seen. 1. No evidence of pulmonary embolism. 2. Patchy bibasilar airspace opacities suggestive of infection. 3. Small hiatal hernia       Discharge Exam:    HEENT: NCAT,  PERRLA, No JVD  Heart:  RRR, no murmurs, gallops, or rubs.   Lungs:  Improved exam , slight wheeze present   Abd: bowel sounds present, nontender, nondistended, no masses  Extrem:  No clubbing, cyanosis, or edema    Disposition: home     Patient Condition at Discharge: stable     Patient Instructions:      Medication List      START taking these medications    benzonatate 100 MG capsule  Commonly known as:  TESSALON  Take 1 capsule by mouth 3 times daily as needed for Cough     cefdinir 300 MG capsule  Commonly known as:  OMNICEF  Take 1 in preparing discharge papers, discussing discharge with patient, medication review, etc.    Signed:  Ines Smith MD  5/2/2019  2:01 PM

## 2019-05-03 LAB
BLOOD CULTURE, ROUTINE: NORMAL
CULTURE, BLOOD 2: NORMAL

## 2019-05-15 ENCOUNTER — TELEPHONE (OUTPATIENT)
Dept: CARDIOLOGY CLINIC | Age: 59
End: 2019-05-15

## 2019-08-30 ENCOUNTER — TELEPHONE (OUTPATIENT)
Dept: CARDIOLOGY CLINIC | Age: 59
End: 2019-08-30

## 2022-08-04 ENCOUNTER — APPOINTMENT (OUTPATIENT)
Dept: ULTRASOUND IMAGING | Age: 62
End: 2022-08-04
Payer: COMMERCIAL

## 2022-08-04 ENCOUNTER — APPOINTMENT (OUTPATIENT)
Dept: GENERAL RADIOLOGY | Age: 62
End: 2022-08-04
Payer: COMMERCIAL

## 2022-08-04 ENCOUNTER — HOSPITAL ENCOUNTER (EMERGENCY)
Age: 62
Discharge: HOME | End: 2022-08-04
Payer: COMMERCIAL

## 2022-08-04 VITALS
HEART RATE: 79 BPM | OXYGEN SATURATION: 98 % | BODY MASS INDEX: 38.64 KG/M2 | SYSTOLIC BLOOD PRESSURE: 137 MMHG | TEMPERATURE: 98.3 F | RESPIRATION RATE: 18 BRPM | HEIGHT: 62 IN | WEIGHT: 210 LBS | DIASTOLIC BLOOD PRESSURE: 79 MMHG

## 2022-08-04 DIAGNOSIS — M79.605 PAIN OF LEFT LOWER EXTREMITY: Primary | ICD-10-CM

## 2022-08-04 DIAGNOSIS — M25.561 RIGHT KNEE PAIN, UNSPECIFIED CHRONICITY: ICD-10-CM

## 2022-08-04 DIAGNOSIS — M89.9 LYTIC LESION OF BONE ON X-RAY: ICD-10-CM

## 2022-08-04 LAB
BASOPHILS ABSOLUTE: 0.05 E9/L (ref 0–0.2)
BASOPHILS RELATIVE PERCENT: 0.8 % (ref 0–2)
CO2: 27 MMOL/L (ref 22–29)
EOSINOPHILS ABSOLUTE: 0.14 E9/L (ref 0.05–0.5)
EOSINOPHILS RELATIVE PERCENT: 2.3 % (ref 0–6)
GFR AFRICAN AMERICAN: >60
GFR NON-AFRICAN AMERICAN: >60 ML/MIN/1.73
GLUCOSE BLD-MCNC: 85 MG/DL (ref 74–99)
HCT VFR BLD CALC: 40.7 % (ref 34–48)
HEMOGLOBIN: 13.5 G/DL (ref 11.5–15.5)
IMMATURE GRANULOCYTES #: 0.02 E9/L
IMMATURE GRANULOCYTES %: 0.3 % (ref 0–5)
LYMPHOCYTES ABSOLUTE: 1.96 E9/L (ref 1.5–4)
LYMPHOCYTES RELATIVE PERCENT: 31.8 % (ref 20–42)
MCH RBC QN AUTO: 30.3 PG (ref 26–35)
MCHC RBC AUTO-ENTMCNC: 33.2 % (ref 32–34.5)
MCV RBC AUTO: 91.3 FL (ref 80–99.9)
MONOCYTES ABSOLUTE: 0.52 E9/L (ref 0.1–0.95)
MONOCYTES RELATIVE PERCENT: 8.4 % (ref 2–12)
NEUTROPHILS ABSOLUTE: 3.47 E9/L (ref 1.8–7.3)
NEUTROPHILS RELATIVE PERCENT: 56.4 % (ref 43–80)
PDW BLD-RTO: 12.6 FL (ref 11.5–15)
PERFORMED ON: NORMAL
PLATELET # BLD: 266 E9/L (ref 130–450)
PMV BLD AUTO: 10.9 FL (ref 7–12)
POC ANION GAP: 10 MMOL/L (ref 7–16)
POC BUN: 10 MG/DL (ref 8–23)
POC CHLORIDE: 107 MMOL/L (ref 100–108)
POC CREATININE: 0.8 MG/DL (ref 0.5–1)
POC POTASSIUM: 4 MMOL/L (ref 3.5–5)
POC SODIUM: 144 MMOL/L (ref 132–146)
RBC # BLD: 4.46 E12/L (ref 3.5–5.5)
WBC # BLD: 6.2 E9/L (ref 4.5–11.5)

## 2022-08-04 PROCEDURE — 73610 X-RAY EXAM OF ANKLE: CPT

## 2022-08-04 PROCEDURE — 80051 ELECTROLYTE PANEL: CPT

## 2022-08-04 PROCEDURE — 73560 X-RAY EXAM OF KNEE 1 OR 2: CPT

## 2022-08-04 PROCEDURE — 73630 X-RAY EXAM OF FOOT: CPT

## 2022-08-04 PROCEDURE — 82565 ASSAY OF CREATININE: CPT

## 2022-08-04 PROCEDURE — 99211 OFF/OP EST MAY X REQ PHY/QHP: CPT

## 2022-08-04 PROCEDURE — 36415 COLL VENOUS BLD VENIPUNCTURE: CPT

## 2022-08-04 PROCEDURE — 82947 ASSAY GLUCOSE BLOOD QUANT: CPT

## 2022-08-04 PROCEDURE — 84520 ASSAY OF UREA NITROGEN: CPT

## 2022-08-04 PROCEDURE — 93971 EXTREMITY STUDY: CPT

## 2022-08-04 PROCEDURE — 85025 COMPLETE CBC W/AUTO DIFF WBC: CPT

## 2022-08-04 RX ORDER — METHYLPREDNISOLONE 4 MG/1
TABLET ORAL
Qty: 1 KIT | Refills: 0 | Status: SHIPPED | OUTPATIENT
Start: 2022-08-04

## 2022-08-04 ASSESSMENT — PAIN SCALES - GENERAL: PAINLEVEL_OUTOF10: 4

## 2022-08-04 ASSESSMENT — PAIN DESCRIPTION - LOCATION: LOCATION: ANKLE

## 2022-08-04 ASSESSMENT — PAIN - FUNCTIONAL ASSESSMENT: PAIN_FUNCTIONAL_ASSESSMENT: 0-10

## 2022-08-04 NOTE — ED PROVIDER NOTES
[id number] [facility]  Department of Emergency Medicine  UC Encounter Note  22   4:46 PM EDT      NAME: Anabel Villalba  :  1960  MRN:  [id number]    Chief Complaint: Foot Pain (Having pain and swelling in left foot and left ankle. Denies specific injury.) and Knee Pain (Having pain behind right knee. Denies injury.)      This is a 71-year-old female the presents to urgent care complaining of left foot and ankle pain for the past couple days as well as right posterior knee and upper calf pain for the past several weeks. She does state a history of a knee injury about a year ago on the right side and never had looked at and she has been having residual pain since then. She denies any numbness or tingling. On first contact patient she appears to be in no acute distress. Review of Systems  Pertinent positives and negatives are stated within HPI, all other systems reviewed and are negative. Physical Exam  Vitals and nursing note reviewed. Constitutional:       Appearance: She is well-developed. HENT:      Head: Normocephalic and atraumatic. Right Ear: Hearing and external ear normal.      Left Ear: Hearing and external ear normal.      Nose: Nose normal.      Mouth/Throat:      Pharynx: Uvula midline. Eyes:      General: Lids are normal.      Conjunctiva/sclera: Conjunctivae normal.      Pupils: Pupils are equal, round, and reactive to light. Cardiovascular:      Rate and Rhythm: Normal rate and regular rhythm. Heart sounds: Normal heart sounds. No murmur heard. Pulmonary:      Effort: Pulmonary effort is normal.      Breath sounds: Normal breath sounds. Abdominal:      General: Bowel sounds are normal.      Palpations: Abdomen is soft. Abdomen is not rigid. Tenderness: There is no abdominal tenderness. There is no guarding or rebound. Musculoskeletal:      Cervical back: Normal range of motion and neck supple.       Comments: Right popliteal area is tender to palpation. I do not appreciate any elevated vein. The knee joint is stable. There is no swelling to the right calf. No erythema. The left ankle and foot is tender to palpation there is no swelling. No cyanosis no open area no redness has palpable pedal pulses. I do not appreciate any deformity or laxity with the left Achilles tendon. No left knee pain. The left hip pain. Skin:     General: Skin is warm and dry. Findings: No abrasion or rash. Neurological:      Mental Status: She is alert and oriented to person, place, and time. GCS: GCS eye subscore is 4. GCS verbal subscore is 5. GCS motor subscore is 6. Cranial Nerves: No cranial nerve deficit. Sensory: No sensory deficit. Coordination: Coordination normal.      Gait: Gait normal.       Procedures    MDM  Number of Diagnoses or Management Options  Lytic lesion of bone on x-ray  Pain of left lower extremity  Right knee pain, unspecified chronicity  Diagnosis management comments: Obtain testing here and ultrasound at the hospital to rule out DVT. Ultrasound was negative. X-rays were reviewed ultrasound was reviewed. Labs reviewed. The x-ray does show a lytic lesion to the left lower tibia I did tell that to the patient to follow-up with her doctor or specialist.  I will place her on a Medrol Dosepak for joint pain. Instructions given.             --------------------------------------------- PAST HISTORY ---------------------------------------------  Past Medical History:  has a past medical history of Thyroid disease. Past Surgical History:  has a past surgical history that includes back surgery and ECHO Compl W Dop Color Flow (7/8/2012). Social History:  reports that she has never smoked. She has never used smokeless tobacco. She reports current alcohol use. She reports that she does not use drugs. Family History: family history is not on file.      The patients home medications have been reviewed. Allergies: Dilaudid [hydromorphone hcl]    -------------------------------------------------- RESULTS -------------------------------------------------  Results for orders placed or performed during the hospital encounter of 08/04/22   CBC with Auto Differential   Result Value Ref Range    WBC 6.2 4.5 - 11.5 E9/L    RBC 4.46 3.50 - 5.50 E12/L    Hemoglobin 13.5 11.5 - 15.5 g/dL    Hematocrit 40.7 34.0 - 48.0 %    MCV 91.3 80.0 - 99.9 fL    MCH 30.3 26.0 - 35.0 pg    MCHC 33.2 32.0 - 34.5 %    RDW 12.6 11.5 - 15.0 fL    Platelets 158 435 - 577 E9/L    MPV 10.9 7.0 - 12.0 fL    Neutrophils % 56.4 43.0 - 80.0 %    Immature Granulocytes % 0.3 0.0 - 5.0 %    Lymphocytes % 31.8 20.0 - 42.0 %    Monocytes % 8.4 2.0 - 12.0 %    Eosinophils % 2.3 0.0 - 6.0 %    Basophils % 0.8 0.0 - 2.0 %    Neutrophils Absolute 3.47 1.80 - 7.30 E9/L    Immature Granulocytes # 0.02 E9/L    Lymphocytes Absolute 1.96 1.50 - 4.00 E9/L    Monocytes Absolute 0.52 0.10 - 0.95 E9/L    Eosinophils Absolute 0.14 0.05 - 0.50 E9/L    Basophils Absolute 0.05 0.00 - 0.20 E9/L   POCT Venous   Result Value Ref Range    POC Sodium 144 132 - 146 mmol/L    POC Potassium 4.0 3.5 - 5.0 mmol/L    POC Chloride 107 100 - 108 mmol/L    CO2 27 22 - 29 mmol/L    POC Anion Gap 10 7 - 16 mmol/L    POC Glucose 85 74 - 99 mg/dl    POC BUN 10 8 - 23 mg/dL    POC Creatinine 0.8 0.5 - 1.0 mg/dL    GFR Non-African American >60 >=60 mL/min/1.73    GFR  >60     Performed on SEE BELOW      US DUP LOWER EXTREMITY RIGHT JERAMY   Final Result   No evidence of DVT in the right lower extremity. RECOMMENDATIONS:   Unavailable         XR FOOT LEFT (MIN 3 VIEWS)   Final Result   No acute fracture is identified. XR ANKLE LEFT (MIN 3 VIEWS)   Final Result   No acute fracture is identified. Tibial shaft lytic lesion suggested. XR KNEE RIGHT (1-2 VIEWS)   Final Result   No acute fracture is identified. ------------------------- NURSING NOTES AND VITALS REVIEWED ---------------------------   The nursing notes within the ED encounter and vital signs as below have been reviewed. /79   Pulse 79   Temp 98.3 °F (36.8 °C)   Resp 18   Ht 5' 2\" (1.575 m)   Wt 210 lb (95.3 kg)   LMP 06/24/2012   SpO2 98%   BMI 38.41 kg/m²   Oxygen Saturation Interpretation: Normal      ------------------------------------------ PROGRESS NOTES ------------------------------------------   I have spoken with the patient and discussed todays results, in addition to providing specific details for the plan of care and counseling regarding the diagnosis and prognosis. Their questions are answered at this time and they are agreeable with the plan.      --------------------------------- ADDITIONAL PROVIDER NOTES ---------------------------------     This patient is stable for discharge. I have shared the specific conditions for return, as well as the importance of follow-up. * NOTE: This report was transcribed using voice recognition software. Every effort was made to ensure accuracy; however, inadvertent computerized transcription errors may be present.    --------------------------------- IMPRESSION AND DISPOSITION ---------------------------------    IMPRESSION  1. Pain of left lower extremity    2. Lytic lesion of bone on x-ray    3.  Right knee pain, unspecified chronicity        DISPOSITION  Disposition: Discharge to home  Patient condition is good       Na Flores PA-C  08/04/22 2029